# Patient Record
Sex: FEMALE | Race: OTHER | URBAN - METROPOLITAN AREA
[De-identification: names, ages, dates, MRNs, and addresses within clinical notes are randomized per-mention and may not be internally consistent; named-entity substitution may affect disease eponyms.]

---

## 2021-04-26 ENCOUNTER — OUTPATIENT (OUTPATIENT)
Dept: OUTPATIENT SERVICES | Facility: HOSPITAL | Age: 51
LOS: 1 days | Discharge: HOME | End: 2021-04-26
Payer: MEDICAID

## 2021-04-26 PROCEDURE — 88300 SURGICAL PATH GROSS: CPT | Mod: 26,59

## 2021-04-26 PROCEDURE — 88305 TISSUE EXAM BY PATHOLOGIST: CPT | Mod: 26

## 2021-05-01 LAB — SURGICAL PATHOLOGY STUDY: SIGNIFICANT CHANGE UP

## 2022-03-07 ENCOUNTER — INPATIENT (INPATIENT)
Facility: HOSPITAL | Age: 52
LOS: 1 days | Discharge: HOME | End: 2022-03-09
Attending: INTERNAL MEDICINE | Admitting: INTERNAL MEDICINE
Payer: MEDICAID

## 2022-03-07 VITALS
HEIGHT: 61 IN | TEMPERATURE: 98 F | SYSTOLIC BLOOD PRESSURE: 149 MMHG | OXYGEN SATURATION: 99 % | DIASTOLIC BLOOD PRESSURE: 98 MMHG | WEIGHT: 132.06 LBS | RESPIRATION RATE: 16 BRPM | HEART RATE: 80 BPM

## 2022-03-07 LAB
ALBUMIN SERPL ELPH-MCNC: 3.7 G/DL — SIGNIFICANT CHANGE UP (ref 3.3–5)
ALP SERPL-CCNC: 114 U/L — SIGNIFICANT CHANGE UP (ref 40–120)
ALT FLD-CCNC: 23 U/L — SIGNIFICANT CHANGE UP (ref 12–78)
ANION GAP SERPL CALC-SCNC: 5 MMOL/L — SIGNIFICANT CHANGE UP (ref 5–17)
APPEARANCE UR: CLEAR — SIGNIFICANT CHANGE UP
APTT BLD: 34.9 SEC — SIGNIFICANT CHANGE UP (ref 27.5–35.5)
AST SERPL-CCNC: 23 U/L — SIGNIFICANT CHANGE UP (ref 15–37)
BASOPHILS # BLD AUTO: 0.03 K/UL — SIGNIFICANT CHANGE UP (ref 0–0.2)
BASOPHILS NFR BLD AUTO: 0.4 % — SIGNIFICANT CHANGE UP (ref 0–2)
BILIRUB SERPL-MCNC: 0.2 MG/DL — SIGNIFICANT CHANGE UP (ref 0.2–1.2)
BILIRUB UR-MCNC: NEGATIVE — SIGNIFICANT CHANGE UP
BUN SERPL-MCNC: 15 MG/DL — SIGNIFICANT CHANGE UP (ref 7–23)
CALCIUM SERPL-MCNC: 9.1 MG/DL — SIGNIFICANT CHANGE UP (ref 8.5–10.1)
CHLORIDE SERPL-SCNC: 104 MMOL/L — SIGNIFICANT CHANGE UP (ref 96–108)
CO2 SERPL-SCNC: 30 MMOL/L — SIGNIFICANT CHANGE UP (ref 22–31)
COLOR SPEC: YELLOW — SIGNIFICANT CHANGE UP
CREAT SERPL-MCNC: 0.86 MG/DL — SIGNIFICANT CHANGE UP (ref 0.5–1.3)
DIFF PNL FLD: ABNORMAL
EGFR: 83 ML/MIN/1.73M2 — SIGNIFICANT CHANGE UP
EOSINOPHIL # BLD AUTO: 0.03 K/UL — SIGNIFICANT CHANGE UP (ref 0–0.5)
EOSINOPHIL NFR BLD AUTO: 0.4 % — SIGNIFICANT CHANGE UP (ref 0–6)
FLUAV AG NPH QL: SIGNIFICANT CHANGE UP
FLUBV AG NPH QL: SIGNIFICANT CHANGE UP
GLUCOSE BLDC GLUCOMTR-MCNC: 130 MG/DL — HIGH (ref 70–99)
GLUCOSE SERPL-MCNC: 90 MG/DL — SIGNIFICANT CHANGE UP (ref 70–99)
GLUCOSE UR QL: NEGATIVE MG/DL — SIGNIFICANT CHANGE UP
HCT VFR BLD CALC: 37.9 % — SIGNIFICANT CHANGE UP (ref 34.5–45)
HGB BLD-MCNC: 12.6 G/DL — SIGNIFICANT CHANGE UP (ref 11.5–15.5)
IMM GRANULOCYTES NFR BLD AUTO: 0.3 % — SIGNIFICANT CHANGE UP (ref 0–1.5)
INR BLD: 0.97 RATIO — SIGNIFICANT CHANGE UP (ref 0.88–1.16)
KETONES UR-MCNC: NEGATIVE — SIGNIFICANT CHANGE UP
LACTATE SERPL-SCNC: 1.5 MMOL/L — SIGNIFICANT CHANGE UP (ref 0.7–2)
LEUKOCYTE ESTERASE UR-ACNC: ABNORMAL
LYMPHOCYTES # BLD AUTO: 3.75 K/UL — HIGH (ref 1–3.3)
LYMPHOCYTES # BLD AUTO: 48.3 % — HIGH (ref 13–44)
MCHC RBC-ENTMCNC: 30.1 PG — SIGNIFICANT CHANGE UP (ref 27–34)
MCHC RBC-ENTMCNC: 33.2 G/DL — SIGNIFICANT CHANGE UP (ref 32–36)
MCV RBC AUTO: 90.5 FL — SIGNIFICANT CHANGE UP (ref 80–100)
MONOCYTES # BLD AUTO: 0.62 K/UL — SIGNIFICANT CHANGE UP (ref 0–0.9)
MONOCYTES NFR BLD AUTO: 8 % — SIGNIFICANT CHANGE UP (ref 2–14)
NEUTROPHILS # BLD AUTO: 3.31 K/UL — SIGNIFICANT CHANGE UP (ref 1.8–7.4)
NEUTROPHILS NFR BLD AUTO: 42.6 % — LOW (ref 43–77)
NITRITE UR-MCNC: NEGATIVE — SIGNIFICANT CHANGE UP
NRBC # BLD: 0 /100 WBCS — SIGNIFICANT CHANGE UP (ref 0–0)
PH UR: 8 — SIGNIFICANT CHANGE UP (ref 5–8)
PLATELET # BLD AUTO: 319 K/UL — SIGNIFICANT CHANGE UP (ref 150–400)
POTASSIUM SERPL-MCNC: 4.1 MMOL/L — SIGNIFICANT CHANGE UP (ref 3.5–5.3)
POTASSIUM SERPL-SCNC: 4.1 MMOL/L — SIGNIFICANT CHANGE UP (ref 3.5–5.3)
PROT SERPL-MCNC: 8.2 GM/DL — SIGNIFICANT CHANGE UP (ref 6–8.3)
PROT UR-MCNC: NEGATIVE MG/DL — SIGNIFICANT CHANGE UP
PROTHROM AB SERPL-ACNC: 11.6 SEC — SIGNIFICANT CHANGE UP (ref 10.5–13.4)
RBC # BLD: 4.19 M/UL — SIGNIFICANT CHANGE UP (ref 3.8–5.2)
RBC # FLD: 12.8 % — SIGNIFICANT CHANGE UP (ref 10.3–14.5)
SARS-COV-2 RNA SPEC QL NAA+PROBE: SIGNIFICANT CHANGE UP
SODIUM SERPL-SCNC: 139 MMOL/L — SIGNIFICANT CHANGE UP (ref 135–145)
SP GR SPEC: 1 — LOW (ref 1.01–1.02)
TROPONIN I, HIGH SENSITIVITY RESULT: 7.3 NG/L — SIGNIFICANT CHANGE UP
UROBILINOGEN FLD QL: NEGATIVE MG/DL — SIGNIFICANT CHANGE UP
WBC # BLD: 7.76 K/UL — SIGNIFICANT CHANGE UP (ref 3.8–10.5)
WBC # FLD AUTO: 7.76 K/UL — SIGNIFICANT CHANGE UP (ref 3.8–10.5)

## 2022-03-07 PROCEDURE — 70498 CT ANGIOGRAPHY NECK: CPT | Mod: 26,MA

## 2022-03-07 PROCEDURE — 70496 CT ANGIOGRAPHY HEAD: CPT | Mod: 26,MA

## 2022-03-07 PROCEDURE — 0042T: CPT

## 2022-03-07 PROCEDURE — 93010 ELECTROCARDIOGRAM REPORT: CPT

## 2022-03-07 PROCEDURE — 99291 CRITICAL CARE FIRST HOUR: CPT

## 2022-03-07 PROCEDURE — 99223 1ST HOSP IP/OBS HIGH 75: CPT

## 2022-03-07 RX ORDER — ACETAMINOPHEN 500 MG
650 TABLET ORAL EVERY 6 HOURS
Refills: 0 | Status: DISCONTINUED | OUTPATIENT
Start: 2022-03-07 | End: 2022-03-09

## 2022-03-07 RX ORDER — SODIUM CHLORIDE 9 MG/ML
1000 INJECTION INTRAMUSCULAR; INTRAVENOUS; SUBCUTANEOUS ONCE
Refills: 0 | Status: COMPLETED | OUTPATIENT
Start: 2022-03-07 | End: 2022-03-07

## 2022-03-07 RX ORDER — ASPIRIN/CALCIUM CARB/MAGNESIUM 324 MG
81 TABLET ORAL DAILY
Refills: 0 | Status: DISCONTINUED | OUTPATIENT
Start: 2022-03-07 | End: 2022-03-09

## 2022-03-07 RX ORDER — METOCLOPRAMIDE HCL 10 MG
10 TABLET ORAL ONCE
Refills: 0 | Status: COMPLETED | OUTPATIENT
Start: 2022-03-07 | End: 2022-03-07

## 2022-03-07 RX ORDER — ONDANSETRON 8 MG/1
4 TABLET, FILM COATED ORAL EVERY 8 HOURS
Refills: 0 | Status: DISCONTINUED | OUTPATIENT
Start: 2022-03-07 | End: 2022-03-09

## 2022-03-07 RX ORDER — LANOLIN ALCOHOL/MO/W.PET/CERES
3 CREAM (GRAM) TOPICAL AT BEDTIME
Refills: 0 | Status: DISCONTINUED | OUTPATIENT
Start: 2022-03-07 | End: 2022-03-09

## 2022-03-07 RX ORDER — ENOXAPARIN SODIUM 100 MG/ML
40 INJECTION SUBCUTANEOUS EVERY 24 HOURS
Refills: 0 | Status: DISCONTINUED | OUTPATIENT
Start: 2022-03-07 | End: 2022-03-09

## 2022-03-07 RX ORDER — SODIUM CHLORIDE 9 MG/ML
1000 INJECTION INTRAMUSCULAR; INTRAVENOUS; SUBCUTANEOUS
Refills: 0 | Status: DISCONTINUED | OUTPATIENT
Start: 2022-03-07 | End: 2022-03-09

## 2022-03-07 RX ORDER — ATORVASTATIN CALCIUM 80 MG/1
40 TABLET, FILM COATED ORAL AT BEDTIME
Refills: 0 | Status: DISCONTINUED | OUTPATIENT
Start: 2022-03-07 | End: 2022-03-09

## 2022-03-07 RX ADMIN — SODIUM CHLORIDE 1000 MILLILITER(S): 9 INJECTION INTRAMUSCULAR; INTRAVENOUS; SUBCUTANEOUS at 21:11

## 2022-03-07 NOTE — ED PROVIDER NOTE - CRITICAL CARE ATTENDING CONTRIBUTION TO CARE
Dr. Vito SANDERS: I performed a face to face bedside interview with patient regarding history of present illness, review of symptoms and past medical history. I completed an independent physical exam.  I have discussed patient's plan of care with PA.   I agree with note as stated above, having amended the EMR as needed to reflect my findings.   This includes HISTORY OF PRESENT ILLNESS, HIV, PAST MEDICAL/SURGICAL/FAMILY/SOCIAL HISTORY, ALLERGIES AND HOME MEDICATIONS, REVIEW OF SYSTEMS, PHYSICAL EXAM, and any PROGRESS NOTES during the time I functioned as the attending physician for this patient.     I have discussed the case with the resident/mid-level provider. I have personally performed a history, physical exam, and my own medical decision making. I have reviewed the note and agree with the findings and plan with the following exceptions: None.    Upon my evaluation, this patient had a high probability of imminent or life threatening deterioration due to CVA , which required my direct attention, intervention, and personal management.     I have personally provided 35 minutes of critical care time exclusive of time spent on separately billable procedures. Time includes review of laboratory data, radiology results, discussion with consultants, and monitoring for potential decompensation. Interventions were performed as documented above.    - Alex Padron MD, Emergency Medicine and Medical Toxicology Attending.

## 2022-03-07 NOTE — ED PROVIDER NOTE - PHYSICAL EXAMINATION
VITAL SIGNS: I have reviewed nursing notes and confirm.   GEN: Well-developed; well-nourished; in no acute distress. Speaking full sentences.  SKIN: Warm, pink, no rash, no diaphoresis, no cyanosis, well perfused.   HEAD: Normocephalic; atraumatic. No scalp lacerations, no abrasions.  NECK: Supple; non tender.   EYES: Pupils 3mm equal, round, reactive to light and accomodation, conjunctiva and sclera clear. Extra-ocular movements intact bilaterally.  ENT: No nasal discharge; airway clear. Trachea is midline. ORAL: No oropharyngeal exudates or erythema. Normal dentition.  CV: Regular rate and rhythm. S1, S2 normal; no murmurs, gallops, or rubs. No lower extremity pitting edema bilaterally. Capillary refill < 2 seconds throughout. Distal pulses intact 2+ throughout.  RESP: CTA bilaterally. No wheezes, rales, or rhonchi.   ABD: Normal bowel sounds, soft, non-distended, non-tender, no rebound, no guarding, no rigidity, no hepatosplenomegaly. No CVA tenderness bilaterally.  MSK: Normal range of motion and movement of all 4 extremities. No joint or muscular pain throughout. No clubbing.   BACK: No thoracolumbar midline or paravertebral tenderness. No step-offs or obvious deformities.  NEURO: Alert & oriented x 3, Grossly unremarkable. No facial droop. 4/5 motor strength in UE/LE. Decreased sensation in RUE to soft touch. otherwise sensory intact throughout. No facial numbness. Normal speech and coordination.   PSYCH: Cooperative, appropriate.

## 2022-03-07 NOTE — H&P ADULT - HISTORY OF PRESENT ILLNESS
This is a 51F PMHx of hypothyroidism presenting with RUE and RLE weakness as well as decreased sensation on R side of body and face since 2 PM today. Symptoms have not improved. denies associated symptoms such as pain, fever, chills syncope, h/a, confusion. CT and CTA of head negative for acute pathology. out of tPA window. /98. weakness in R upper and lower extremities on exam.

## 2022-03-07 NOTE — ED PROVIDER NOTE - PROGRESS NOTE DETAILS
- - -
CT head negative, CTA head/neck negative, CT perfusion negative. no neurosurgical intervention needed. Still w/ right sided weakness, without progression. will presume CVA, and will need MRI HEAD. neuro: unchanged with right sided weakness in UE/LE w/o facial droop, a/ox4, cooperative, coherent, no aphasia or dysarthria.

## 2022-03-07 NOTE — ED ADULT TRIAGE NOTE - CHIEF COMPLAINT QUOTE
right sided weakness with tongue heaviness since 1400 today. no bilateral extremity drift noted, no facial droop noted, no slurred speech noted. patient states she feels some pressure on her neck and vision changes, denies falls/trauma, denies n/v/dizziness, denies any pain. no neuro deficits noted  hx of hypothyroidism

## 2022-03-07 NOTE — H&P ADULT - NSHPPHYSICALEXAM_GEN_ALL_CORE
Constitutional: NAD aaox3  CV: RRR S1S2  Pulm: CTA b/l   GI: soft nontender nondistended + BS   Neuro CN II-XII grossly intact. 4/5 motor strength in UE/LE. Decreased sensation in RUE to soft touch. otherwise sensory intact throughout. No facial numbness. Normal speech and coordination.  Musculoskeletal: no pedal edema or calf tenderness.

## 2022-03-07 NOTE — ED PROVIDER NOTE - OBJECTIVE STATEMENT
51F PMHx of hypothyroidism presenting with weakness, last known well at 2pm today. Reporting right sided arm and leg weakness, difficulty walking, and right neck heaviness. Denies any chest pain, shortness of breath, fevers, chills, headaches, neck pain, neck stiffness, abdominal pain, nausea/vomiting, prior CVA. 51F PMHx of hypothyroidism presenting with weakness, last known well at 2pm today. Reporting right sided arm and leg weakness, difficulty walking, and right neck heaviness. Denies any chest pain, shortness of breath, fevers, chills, headaches, neck pain, neck stiffness, abdominal pain, nausea/vomiting, prior CVA.    this is a 51 y.o male with a PMhx of Hypothyroidism, with weakness. Patient has been having right arm weakness and leg weakness since 2 pm today. she states that the symptoms were sudden onset, weakness in the has been persistent. Patient also states that she has been having a numbness on the tongue as well, he denies having any fever, chills, bodyaches, headaches, dizziness, nausea, or vomiting, as per daughter she hasn't noticed changes in mental status,

## 2022-03-07 NOTE — H&P ADULT - ASSESSMENT
This is a 51F PMHx of hypothyroidism presenting with RUE and RLE weakness as well as decreased sensation on R side of body and face since 2 PM today. Symptoms have not improved. denies associated symptoms such as pain, fever, chills syncope, h/a, confusion. CT and CTA of head negative for acute pathology. out of tPA window. /98. weakness in R upper and lower extremities on exam.     Acute CVA  hypothyroidism  -asa and statin  -telemetry  -TTE with bubble study. carotid dopplers not needed since cta done.   -MRI brain  -f/u lipid panel, tfts, a1c  -neuro consult, PT consult   -npo except meds till swallow eval

## 2022-03-07 NOTE — PATIENT PROFILE ADULT - DOES PATIENT HAVE ADVANCE DIRECTIVE
Sheila ambulatory encounter    URGENT CARE INITIAL EVALUATION    CHIEF COMPLAINT:    Chief Complaint   Patient presents with   • Sore Throat     patient arrived with complaint of sore throat since Sunday.       SUBJECTIVE:  Veronica Sommers is a 40 year old female, who presents with a complaint of sore throat since Sunday. Has also had some slight congestion postnasal drip. Some itchy watery eyes and nose. States allergies been acting up. But since the throat and improved she wanted to make sure she doesn't have strep. No fevers no nausea no headache. Minimal if any cough. Denies any other issues or complaints no other contacts ill.    REVIEW OF SYSTEMS:  A review of systems was performed and findings relevant to this complaint are included in the HPI.    HISTORIES:  ALLERGIES:   Allergen Reactions   • Vicodin [Hydrocodone-Acetaminophen] NAUSEA   • Gluten Meal GI UPSET   • Seasonal Other (See Comments)       MEDICATIONS:  Current Outpatient Prescriptions   Medication Sig   • triamcinolone (ARISTOCORT) 0.1 % cream APPLY TO AFFECTED AREA TID AS NEEDED.  Dispense 60 grams   • ibuprofen (MOTRIN) 600 MG tablet Take 1 tablet by mouth every 6 hours as needed for Pain.   • Cholecalciferol (VITAMIN D3) 2000 units capsule Take 2,000 Units by mouth daily.   • citalopram (CELEXA) 40 MG tablet TAKE 1 TABLET BY MOUTH DAILY.   • APRI 0.15-30 MG-MCG per tablet TAKE 1 TABLET BY MOUTH DAILY.   • triamcinolone (ARISTOCORT) 0.1 % cream APPLY TO AFFECTED AREA TID AS NEEDED.  Dispense 30 grams   • pantoprazole (PROTONIX) 40 MG tablet Take 1 tablet by mouth daily. (Patient taking differently: Take 40 mg by mouth nightly. )   • montelukast (SINGULAIR) 10 MG tablet Take 1 tablet by mouth nightly.   • loratadine (CLARITIN) 10 MG tablet Take 10 mg by mouth daily.   • fluticasone (FLONASE) 50 MCG/ACT nasal spray Spray 1 spray in each nostril daily.   • terconazole 0.4 % vaginal cream Insert one applicatorful intravaginally for 7  consecutive days   • ferrous sulfate 325 (65 FE) MG tablet Take 1 tablet by mouth daily (with breakfast).   • DRYSOL 20 % external solution APPLY UNDER BOTH ARMS AT BEDTIME, REMOVE NEXT MORNING FOR 3 DAYS, THEN APPLY TWICE A WEEK FOR MAINTENANCE.     No current facility-administered medications for this visit.            I have reviewed the past medical history, family history, social history, medications and allergies listed in the medical record as obtained by my nursing staff and support staff and agree with their documentation    OBJECTIVE:  PHYSICAL EXAM:  Visit Vitals  /76   Pulse 79   Temp 96.6 °F (35.9 °C)   Resp 18   Ht 5' 5\" (1.651 m)   Wt 82.9 kg   LMP 04/20/2018 (Exact Date)   SpO2 97%   BMI 30.41 kg/m²     General:  Well hydrated, well nourished female who appears in no acute distress.  Eyes:  No jaundice, injection or drainage.  Ears:  Normal canals.  Normal tympanic membranes.   No external tenderness.  Oral Cavity:  No trismus.  Good dentition.  No lesions of gums or hard palate.  Posterior Pharynx:  Noninjected positive clear posterior drainage  Neck:  Anterior and posterior lymphadenopathy.  Supple.  Lungs:  Clear to auscultation.  No wheezes, rales or rhonchi.  Abdomen:  Soft.  Nontender.  Nondistended.  No hepatomegaly, no splenomegaly.  No rash.  Skin:  No scarletaform rash appreciated.    URGENT CARE COURSE:  Rapid strep:  Negative  Strep culture:  Pending      ASSESSMENT:  1. Pharyngitis, unspecified etiology    2. Allergic rhinitis, unspecified seasonality, unspecified trigger            PLAN:  Orders Placed This Encounter   • STREP GROUP A SCREEN RAPID WITH REFLEX TO CULTURE   • Strep Grp A Culture - collected in office, sent to lab       FOLLOW-UP:  Primary care as needed    PATIENT INSTRUCTIONS:  Discussed with patient this is likely secondary to allergic rhinitis producing some increased drainage and irritating the throat and eustachian tubes. We'll treat symptomatically at the  current time. Recommend ibuprofen 4-600 mg 2-3 times daily over the next 3-4 days. Fluids rest Tylenol as needed. Symptomatic home care was discussed instructions were given. If he have any new or worsening symptoms should return for reevaluation at that time.    The patient was advised to follow up with primary physician or to recheck with the urgent care clinic sooner if symptoms get worse or if new symptoms appear.    The patient indicated understanding of the diagnosis and agreed with the plan of care.     No

## 2022-03-07 NOTE — PATIENT PROFILE ADULT - FALL HARM RISK - UNIVERSAL INTERVENTIONS
Bed in lowest position, wheels locked, appropriate side rails in place/Call bell, personal items and telephone in reach/Instruct patient to call for assistance before getting out of bed or chair/Non-slip footwear when patient is out of bed/[place] to call system/Physically safe environment - no spills, clutter or unnecessary equipment/Purposeful Proactive Rounding/Room/bathroom lighting operational, light cord in reach

## 2022-03-07 NOTE — ED PROVIDER NOTE - CARE PLAN
1 Principal Discharge DX:	CVA (cerebrovascular accident)   Principal Discharge DX:	Arm weakness   Principal Discharge DX:	Acute CVA (cerebrovascular accident)

## 2022-03-08 DIAGNOSIS — E03.9 HYPOTHYROIDISM, UNSPECIFIED: ICD-10-CM

## 2022-03-08 DIAGNOSIS — I63.9 CEREBRAL INFARCTION, UNSPECIFIED: ICD-10-CM

## 2022-03-08 LAB
A1C WITH ESTIMATED AVERAGE GLUCOSE RESULT: 5.7 % — HIGH (ref 4–5.6)
ALBUMIN SERPL ELPH-MCNC: 3.4 G/DL — SIGNIFICANT CHANGE UP (ref 3.3–5)
ALP SERPL-CCNC: 97 U/L — SIGNIFICANT CHANGE UP (ref 40–120)
ALT FLD-CCNC: 21 U/L — SIGNIFICANT CHANGE UP (ref 12–78)
ANION GAP SERPL CALC-SCNC: 4 MMOL/L — LOW (ref 5–17)
AST SERPL-CCNC: 21 U/L — SIGNIFICANT CHANGE UP (ref 15–37)
BACTERIA # UR AUTO: ABNORMAL
BILIRUB SERPL-MCNC: 0.4 MG/DL — SIGNIFICANT CHANGE UP (ref 0.2–1.2)
BUN SERPL-MCNC: 12 MG/DL — SIGNIFICANT CHANGE UP (ref 7–23)
CALCIUM SERPL-MCNC: 9.3 MG/DL — SIGNIFICANT CHANGE UP (ref 8.5–10.1)
CHLORIDE SERPL-SCNC: 106 MMOL/L — SIGNIFICANT CHANGE UP (ref 96–108)
CHOLEST SERPL-MCNC: 251 MG/DL — HIGH
CO2 SERPL-SCNC: 28 MMOL/L — SIGNIFICANT CHANGE UP (ref 22–31)
CREAT SERPL-MCNC: 0.7 MG/DL — SIGNIFICANT CHANGE UP (ref 0.5–1.3)
EGFR: 106 ML/MIN/1.73M2 — SIGNIFICANT CHANGE UP
EPI CELLS # UR: SIGNIFICANT CHANGE UP
ESTIMATED AVERAGE GLUCOSE: 117 MG/DL — HIGH (ref 68–114)
GLUCOSE SERPL-MCNC: 86 MG/DL — SIGNIFICANT CHANGE UP (ref 70–99)
HCT VFR BLD CALC: 36 % — SIGNIFICANT CHANGE UP (ref 34.5–45)
HDLC SERPL-MCNC: 75 MG/DL — SIGNIFICANT CHANGE UP
HGB BLD-MCNC: 12 G/DL — SIGNIFICANT CHANGE UP (ref 11.5–15.5)
LIPID PNL WITH DIRECT LDL SERPL: 160 MG/DL — HIGH
MCHC RBC-ENTMCNC: 30.2 PG — SIGNIFICANT CHANGE UP (ref 27–34)
MCHC RBC-ENTMCNC: 33.3 G/DL — SIGNIFICANT CHANGE UP (ref 32–36)
MCV RBC AUTO: 90.5 FL — SIGNIFICANT CHANGE UP (ref 80–100)
NON HDL CHOLESTEROL: 176 MG/DL — HIGH
NRBC # BLD: 0 /100 WBCS — SIGNIFICANT CHANGE UP (ref 0–0)
PLATELET # BLD AUTO: 288 K/UL — SIGNIFICANT CHANGE UP (ref 150–400)
POTASSIUM SERPL-MCNC: 3.9 MMOL/L — SIGNIFICANT CHANGE UP (ref 3.5–5.3)
POTASSIUM SERPL-SCNC: 3.9 MMOL/L — SIGNIFICANT CHANGE UP (ref 3.5–5.3)
PROT SERPL-MCNC: 7.5 GM/DL — SIGNIFICANT CHANGE UP (ref 6–8.3)
RBC # BLD: 3.98 M/UL — SIGNIFICANT CHANGE UP (ref 3.8–5.2)
RBC # FLD: 12.9 % — SIGNIFICANT CHANGE UP (ref 10.3–14.5)
RBC CASTS # UR COMP ASSIST: SIGNIFICANT CHANGE UP /HPF (ref 0–4)
SODIUM SERPL-SCNC: 138 MMOL/L — SIGNIFICANT CHANGE UP (ref 135–145)
T4 FREE SERPL-MCNC: 1.2 NG/DL — SIGNIFICANT CHANGE UP (ref 0.9–1.8)
TRIGL SERPL-MCNC: 76 MG/DL — SIGNIFICANT CHANGE UP
TSH SERPL-MCNC: 5.29 UIU/ML — HIGH (ref 0.36–3.74)
WBC # BLD: 5.96 K/UL — SIGNIFICANT CHANGE UP (ref 3.8–10.5)
WBC # FLD AUTO: 5.96 K/UL — SIGNIFICANT CHANGE UP (ref 3.8–10.5)
WBC UR QL: SIGNIFICANT CHANGE UP

## 2022-03-08 PROCEDURE — 99232 SBSQ HOSP IP/OBS MODERATE 35: CPT

## 2022-03-08 PROCEDURE — 72141 MRI NECK SPINE W/O DYE: CPT | Mod: 26

## 2022-03-08 PROCEDURE — 70551 MRI BRAIN STEM W/O DYE: CPT | Mod: 26

## 2022-03-08 PROCEDURE — 99222 1ST HOSP IP/OBS MODERATE 55: CPT

## 2022-03-08 PROCEDURE — 93306 TTE W/DOPPLER COMPLETE: CPT | Mod: 26

## 2022-03-08 RX ADMIN — SODIUM CHLORIDE 75 MILLILITER(S): 9 INJECTION INTRAMUSCULAR; INTRAVENOUS; SUBCUTANEOUS at 05:53

## 2022-03-08 RX ADMIN — Medication 81 MILLIGRAM(S): at 11:58

## 2022-03-08 RX ADMIN — ATORVASTATIN CALCIUM 40 MILLIGRAM(S): 80 TABLET, FILM COATED ORAL at 21:48

## 2022-03-08 RX ADMIN — Medication 650 MILLIGRAM(S): at 20:06

## 2022-03-08 RX ADMIN — SODIUM CHLORIDE 75 MILLILITER(S): 9 INJECTION INTRAMUSCULAR; INTRAVENOUS; SUBCUTANEOUS at 21:54

## 2022-03-08 RX ADMIN — ENOXAPARIN SODIUM 40 MILLIGRAM(S): 100 INJECTION SUBCUTANEOUS at 05:51

## 2022-03-08 RX ADMIN — Medication 3 MILLIGRAM(S): at 21:48

## 2022-03-08 RX ADMIN — Medication 650 MILLIGRAM(S): at 21:50

## 2022-03-08 NOTE — PHYSICAL THERAPY INITIAL EVALUATION ADULT - PERTINENT HX OF CURRENT PROBLEM, REHAB EVAL
This is a 51F PMHx of hypothyroidism presenting with RUE and RLE weakness as well as decreased sensation on R side of body and face . Pt was admitted c Dx of acute cva. CT,  CTA, and MRI of head negative for acute pathology.

## 2022-03-08 NOTE — SWALLOW BEDSIDE ASSESSMENT ADULT - SLP GENERAL OBSERVATIONS
pt seen sitting at the edge of the bed, alert and oriented x4. pt responded to questions with good accuracy, she verbalized wants/needs and she was able to [facility]. noted good speech intelligibility and voice WNL. dtr present at the bedside.

## 2022-03-08 NOTE — PROGRESS NOTE ADULT - ASSESSMENT
51F PMHx of hypothyroidism presenting with RUE and RLE weakness as well as decreased sensation on R side of body and face.  Symptoms slightly improved.  CT Head, CTA Head and neck without acute pathology.  MRI not suggestive of CVA.      # R sided weakness - CT, MRI not suggestive of CVA.  Neuro input appreciated.  TTE.  MR C-spine d/w Dr. Del Valle.  ASA, Statin.  # Hypothyroidism - Synthroid 50 mcg.  # Inpatient DVT Prophylaxis - Lovenox subcut

## 2022-03-08 NOTE — PHYSICAL THERAPY INITIAL EVALUATION ADULT - ADDITIONAL COMMENTS
There are 3 steps, c close thelma rails, at the entry of the house and one flight of steps, c close thelma rails, to negotiate at home.

## 2022-03-08 NOTE — SWALLOW BEDSIDE ASSESSMENT ADULT - H & P REVIEW
This is a 51F PMHx of hypothyroidism presenting with RUE and RLE weakness as well as decreased sensation on R side of body and face since 2 PM today. Symptoms have not improved. denies associated symptoms such as pain, fever, chills syncope, h/a, confusion. CT and CTA of head negative for acute pathology. out of tPA window. /98. weakness in R upper and lower extremities on exam./yes

## 2022-03-08 NOTE — SWALLOW BEDSIDE ASSESSMENT ADULT - SPECIFY REASON(S)
MD order stated NPO until further recommendations made. DIANA Andujar reported pt tolerated thin liquid without difficulty

## 2022-03-08 NOTE — CONSULT NOTE ADULT - SUBJECTIVE AND OBJECTIVE BOX
NEUROLOGY CONSULT    HPI:  50 yo woman admitted with new onset of right sided weakness and numbness x 1 day.  Patient reports numbness in right face/arm/leg, and weakness in right arm and leg.  Has been having headaches over the past few days, and today reporting vertigo.  Also reporting neck stiffness/discomfort.  No prior history of neurological events.  +family history of migraine headaches.    PMHx: hypothyroidism    EKG: NSR    MRI brain: no acute pathology  CTA head/neck: no LVO    Chol = 256  LDL = 160  A1C = 5.7    NEUROLOGICAL EXAM:  T 98 F  /88 HR 80  MS: Awake, alert, oriented x 4, language fluent, comprehension intact, naming intact, repetition intact, normal affect  CN: PERRLA, EOMI, visual fields intact, no papilledema, +impaired to LT in right V1/V2/V3, face symmetric, hearing intact, no dysarthria, symmetric palatal elevation, tongue midline, symmetric shoulder shrug and SCM strength  Motor: normal bulk, normal tone, 4+/5 in RUE/RLE (proximally and distally)  Sensation: impaired LT/vibration in RUE/RLE, intact proprioception  Reflexes: 2+ at biceps, triceps, brachioradialis, 3+ at patella, ankle bilaterally.  Flexor plantar response bilaterally.  No clonus  Coordination: no dysmetria    Assessment:  50 yo woman with right sided weakness and numbness predominantly affecting the arm and leg, with neck stiffness.    Recommendations:  1. MRI cervical spine to rule out demyelinating lesion  2. PT/OT marshall Del Valle MD  [facility] Department of Neurology  Epilepsy [facility]

## 2022-03-08 NOTE — PROGRESS NOTE ADULT - SUBJECTIVE AND OBJECTIVE BOX
Patient: LIANA JHA [id number] 51y Female                            Hospitalist Attending Note    Seen with daughter at bedside.  C/o persistent R sided weakness, RUE > RLE.  Mild R facial droop.  Symptoms improving since admission.     ____________________PHYSICAL EXAM:  GENERAL:  NAD Alert and Oriented x 3   HEENT: NCAT  CARDIOVASCULAR:  S1, S2  LUNGS: CTAB  ABDOMEN:  soft, (-) tenderness, (-) distension, (+) bowel sounds, (-) guarding, (-) rebound (-) rigidity  EXTREMITIES:  no cyanosis / clubbing / edema.   NEURO: subtle R facial droop.  RUE strength 4/5, RLE strength 5-/5.  LUE, LLE strength 5/5.   ____________________     VITALS:  Vital Signs Last 24 Hrs  T(C): 36.7 (08 Mar 2022 12:16), Max: 36.9 (08 Mar 2022 04:49)  T(F): 98 (08 Mar 2022 12:16), Max: 98.5 (08 Mar 2022 04:49)  HR: 75 (08 Mar 2022 14:58) (68 - 82)  BP: 131/85 (08 Mar 2022 14:58) (118/78 - 149/98)  BP(mean): --  RR: 18 (08 Mar 2022 14:07) (16 - 18)  SpO2: 100% (08 Mar 2022 14:07) (97% - 100%) Daily Height in cm: 154.94 (07 Mar 2022 19:15)    Daily Weight in k.3 (07 Mar 2022 22:56)  CAPILLARY BLOOD GLUCOSE      POCT Blood Glucose.: 130 mg/dL (07 Mar 2022 19:32)    I&O's Summary      HISTORY:  PAST MEDICAL & SURGICAL HISTORY:  Allergies    No Known Allergies    Intolerances       LABS:                        12.0   5.96  )-----------( 288      ( 08 Mar 2022 06:56 )             36.0     03-08    138  |  106  |  12  ----------------------------<  86  3.9   |  28  |  0.70    Ca    9.3      08 Mar 2022 06:56    TPro  7.5  /  Alb  3.4  /  TBili  0.4  /  DBili  x   /  AST  21  /  ALT  21  /  AlkPhos  97  03-08    PT/INR - ( 07 Mar 2022 20:25 )   PT: 11.6 sec;   INR: 0.97 ratio         PTT - ( 07 Mar 2022 20:25 )  PTT:34.9 sec  LIVER FUNCTIONS - ( 08 Mar 2022 06:56 )  Alb: 3.4 g/dL / Pro: 7.5 gm/dL / ALK PHOS: 97 U/L / ALT: 21 U/L / AST: 21 U/L / GGT: x           Urinalysis Basic - ( 07 Mar 2022 22:26 )    Color: Yellow / Appearance: Clear / S.005 / pH: x  Gluc: x / Ketone: Negative  / Bili: Negative / Urobili: Negative mg/dL   Blood: x / Protein: Negative mg/dL / Nitrite: Negative   Leuk Esterase: Trace / RBC: 0-2 /HPF / WBC 0-2   Sq Epi: x / Non Sq Epi: Occasional / Bacteria: Occasional              MEDICATIONS:  MEDICATIONS  (STANDING):  aspirin  chewable 81 milliGRAM(s) Oral daily  atorvastatin 40 milliGRAM(s) Oral at bedtime  enoxaparin Injectable 40 milliGRAM(s) SubCutaneous every 24 hours  sodium chloride 0.9%. 1000 milliLiter(s) (75 mL/Hr) IV Continuous <Continuous>    MEDICATIONS  (PRN):  acetaminophen     Tablet .. 650 milliGRAM(s) Oral every 6 hours PRN Temp greater or equal to 38C (100.4F), Mild Pain (1 - 3)  aluminum hydroxide/magnesium hydroxide/simethicone Suspension 30 milliLiter(s) Oral every 4 hours PRN Dyspepsia  melatonin 3 milliGRAM(s) Oral at bedtime PRN Insomnia  ondansetron Injectable 4 milliGRAM(s) IV Push every 8 hours PRN Nausea and/or Vomiting

## 2022-03-08 NOTE — PHYSICAL THERAPY INITIAL EVALUATION ADULT - GENERAL OBSERVATIONS, REHAB EVAL
Pt was seen sitting in bed c cardiac monitor, alert and Ox4. Pt deferred  Video  and preferred her daughter, Brianna to translate throughout P.T. intervention. Mild facial weakness and swelling of R hand was noted. Sensation of R forearm, wrist and hand and R foot is impaired. Peripheral vision is decreased in thelma lower quarter. Coordination is intact. Weakness MS of R LE is noted c MS 3+/5.

## 2022-03-08 NOTE — SWALLOW BEDSIDE ASSESSMENT ADULT - SLP PERTINENT HISTORY OF CURRENT PROBLEM
Patient Education        hydroxyzine  Pronunciation:  ty DROX ee zeen  Brand:  Vistaril  What is the most important information I should know about hydroxyzine? You should not use hydroxyzine if you are pregnant, especially during the first or second trimester. Hydroxyzine can cause a serious heart problem, especially if you use certain medicines at the same time. Tell your doctor about all your current medicines and any you start or stop using. What is hydroxyzine? Hydroxyzine reduces activity in the central nervous system. It also acts as an antihistamine that reduces the effects of natural chemical histamine in the body. Histamine can produce symptoms of itching, or hives on the skin. Hydroxyzine is used as a sedative to treat anxiety and tension. It is also used together with other medications given during and after general anesthesia. Hydroxyzine is also used to treat allergic skin reactions such as hives or contact dermatitis. Hydroxyzine may also be used for purposes not listed in this medication guide. What should I discuss with my healthcare provider before taking hydroxyzine? You should not use hydroxyzine if you are allergic to it, or if:  · you have long QT syndrome;  · you are allergic to cetirizine (Zyrtec) or levocetirizine (Xyzal); or  · you are in the first trimester of pregnancy. You should not use hydroxyzine if you are pregnant, especially during the first or second trimester. Hydroxyzine could harm the unborn baby or cause birth defects. Use effective birth control to prevent pregnancy while you are using this medicine.   To make sure hydroxyzine is safe for you, tell your doctor if you have:  · blockage in your digestive tract (stomach or intestines);  · bladder obstruction or other urination problems;  · glaucoma;  · heart disease, slow heartbeats;  · personal or family history of long QT syndrome;  · an electrolyte imbalance (such as high or low levels of potassium in your blood);  · if you have recently had a heart attack. It is not known whether hydroxyzine passes into breast milk or if it could harm a nursing baby. You should not breast-feed while using this medicine. Do not give this medicine to a child without medical advice. How should I take hydroxyzine? Follow all directions on your prescription label. Your doctor may occasionally change your dose. Do not use this medicine in larger or smaller amounts or for longer than recommended. Shake the oral suspension (liquid) well just before you measure a dose. Measure liquid medicine with the dosing syringe provided, or with a special dose-measuring spoon or medicine cup. If you do not have a dose-measuring device, ask your pharmacist for one. Hydroxyzine is for short-term use only. You should not take this medicine for longer than 4 months. Call your doctor if your anxiety symptoms do not improve, or if they get worse. Store at room temperature away from moisture and heat. What happens if I miss a dose? Take the missed dose as soon as you remember. Skip the missed dose if it is almost time for your next scheduled dose. Do not take extra medicine to make up the missed dose. What happens if I overdose? Seek emergency medical attention or call the Poison Help line at 1-857.887.7753. Overdose symptoms may include severe drowsiness, nausea, vomiting, uncontrolled muscle movements, or seizure (convulsions). What should I avoid while taking hydroxyzine? This medicine may impair your thinking or reactions. Be careful if you drive or do anything that requires you to be alert. Drinking alcohol with this medicine can cause side effects. What are the possible side effects of hydroxyzine? Get emergency medical help if you have signs of an allergic reaction:  hives; difficult breathing; swelling of your face, lips, tongue, or throat. In rare cases, hydroxyzine may cause a severe skin reaction.  Stop taking this medicine and call information provided by [address]ita [address] is accurate, up-to-date, and complete, but no guarantee is made to that effect. Drug information contained herein may be time sensitive. [facility] information has been compiled for use by healthcare practitioners and consumers in the [place] and therefore [facility] does not warrant that uses outside of the [place] are appropriate, unless specifically indicated otherwise. [facility]'s drug information does not endorse drugs, diagnose patients or recommend therapy. [facility]'s drug information is an informational resource designed to assist licensed healthcare practitioners in caring for their patients and/or to serve consumers viewing this service as a supplement to, and not a substitute for, the expertise, skill, knowledge and judgment of healthcare practitioners. The absence of a warning for a given drug or drug combination in no way should be construed to indicate that the drug or drug combination is safe, effective or appropriate for any given patient. [facility] does not assume any responsibility for any aspect of healthcare administered with the aid of information [facility] provides. The information contained herein is not intended to cover all possible uses, directions, precautions, warnings, drug interactions, allergic reactions, or adverse effects. If you have questions about the drugs you are taking, check with your doctor, nurse or pharmacist.  Copyright 0401-6959 [address]. Version: 8.01. Revision date: 3/15/2017. Care instructions adapted under license by [facility] [place] ([facility]). If you have questions about a medical condition or this instruction, always ask your healthcare professional. [place] [address] any warranty or liability for your use of this information. Patient Education        buspirone  Pronunciation:  daisy nunez  Brand: BuSpar  What is the most important information I should know about buspirone?   Do not use this from another anxiety medication, you may need to slowly decrease your dose of the other medication rather than stopping suddenly. Some anxiety medications can cause withdrawal symptoms when you stop taking them suddenly after long-term use. Read and carefully follow any Instructions for Use provided with your medicine. Ask your doctor or pharmacist if you do not understand these instructions. Some buspirone tablets are scored so you can break the tablet into 2 or 3 pieces in order to take a smaller dose. Do not use a buspirone tablet if it has not been broken correctly and the piece is too big or too small. Follow your doctor's instructions about how much of the tablet to take. Buspirone can cause false positive results with certain medical tests. You may need to stop using the medicine for at least 48 hours before your test. Tell any doctor who treats you that you are using buspirone. Store at room temperature away from moisture, heat, and light. What happens if I miss a dose? Take the medicine as soon as you can, but skip the missed dose if it is almost time for your next dose. Do not take two doses at one time. What happens if I overdose? Seek emergency medical attention or call the Poison Help line at 1-600.896.3983. What should I avoid while taking buspirone? Avoid driving or hazardous activity until you know how this medicine will affect you. Your reactions could be impaired. Drinking alcohol may increase certain side effects of buspirone. Grapefruit may interact with buspirone and lead to unwanted side effects. Avoid the use of grapefruit products. What are the possible side effects of buspirone? Get emergency medical help if you have signs of an allergic reaction: hives; difficult breathing; swelling of your face, lips, tongue, or throat. Call your doctor at once if you have:  · chest pain;  · shortness of breath; or  · a light-headed feeling, like you might pass out.   Seek medical attention right away if you have symptoms of serotonin syndrome, such as: agitation, hallucinations, fever, sweating, shivering, fast heart rate, muscle stiffness, twitching, loss of coordination, nausea, vomiting, or diarrhea. Common side effects may include:  · headache;  · dizziness, feeling light-headed;  · nausea; or  · feeling nervous or excited. This is not a complete list of side effects and others may occur. Call your doctor for medical advice about side effects. You may report side effects to FDA at 2-989-FDA-7018. What other drugs will affect buspirone? Using buspirone with other drugs that make you drowsy or slow your breathing can worsen these effects. Ask your doctor before using opioid medication, a sleeping pill, a muscle relaxer, or medicine for anxiety or seizures. Tell your doctor about all your current medicines. Many drugs can affect buspirone, especially:  · nefazodone;  · Elena's wort;  · an antibiotic --clarithromycin, erythromycin, rifabutin, rifampin, rifapentine, telithromycin;  · antifungal medicine --itraconazole, ketoconazole;  · antiviral medicine to treat HIV/AIDS --efavirenz, indinavir, nelfinavir, nevirapine, ritonavir, saquinavir;  · cancer medicine --apalutamide, enzalutamide, mitotane;  · heart or blood pressure medicines --diltiazem, verapamil;  · seizure medicine --carbamazepine, oxcarbazepine, phenytoin, primidone; or  · steroid medicine --dexamethasone, prednisone. This list is not complete and many other drugs may affect buspirone. This includes prescription and over-the-counter medicines, vitamins, and herbal products. Not all possible drug interactions are listed here. Where can I get more information? Your pharmacist can provide more information about buspirone. Remember, keep this and all other medicines out of the reach of children, never share your medicines with others, and use this medication only for the indication prescribed.    Every effort has been made to ensure that the information provided by [address] is accurate, up-to-date, and complete, but no guarantee is made to that effect. Drug information contained herein may be time sensitive. [facility] information has been compiled for use by healthcare practitioners and consumers in the [place] and therefore [facility] does not warrant that uses outside of the [place] are appropriate, unless specifically indicated otherwise. [facility]'s drug information does not endorse drugs, diagnose patients or recommend therapy. [facility]'s drug information is an informational resource designed to assist licensed healthcare practitioners in caring for their patients and/or to serve consumers viewing this service as a supplement to, and not a substitute for, the expertise, skill, knowledge and judgment of healthcare practitioners. The absence of a warning for a given drug or drug combination in no way should be construed to indicate that the drug or drug combination is safe, effective or appropriate for any given patient. [facility] does not assume any responsibility for any aspect of healthcare administered with the aid of information [facility] provides. The information contained herein is not intended to cover all possible uses, directions, precautions, warnings, drug interactions, allergic reactions, or adverse effects. If you have questions about the drugs you are taking, check with your doctor, nurse or pharmacist.  Copyright 5102-1521 [address]. Version: 6.01. Revision date: 2/24/2020. Care instructions adapted under license by [facility] [place] ([facility]). If you have questions about a medical condition or this instruction, always ask your healthcare professional. [place] [address] any warranty or liability for your use of this information. Patient Education        sertraline  Pronunciation:  SER tra helene  Brand:  Zoloft  What is the most important information I should know about sertraline?   You should not use sertraline if you also take pimozide, or if you are being treated with methylene blue injection. Do not use this medicine if you have used an MAO inhibitor in the past 14 days, such as isocarboxazid, linezolid, methylene blue injection, phenelzine, rasagiline, selegiline, or tranylcypromine. Some young people have thoughts about suicide when first taking an antidepressant. Stay alert to changes in your mood or symptoms. Report any new or worsening symptoms to your doctor. Seek medical attention right away if you have symptoms of serotonin syndrome, such as: agitation, hallucinations, fever, sweating, shivering, fast heart rate, muscle stiffness, twitching, loss of coordination, nausea, vomiting, or diarrhea. What is sertraline? Sertraline is an antidepressant in a group of drugs called selective serotonin reuptake inhibitors (SSRIs). Sertraline affects chemicals in the brain that may be unbalanced in people with depression, panic, anxiety, or obsessive-compulsive symptoms. Sertraline is used to treat depression, obsessive-compulsive disorder, panic disorder, anxiety disorders, post-traumatic stress disorder (PTSD), and premenstrual dysphoric disorder (PMDD). Sertraline may also be used for purposes not listed in this medication guide. What should I discuss with my healthcare provider before taking sertraline? You should not use sertraline if you are allergic to it, or if you also take pimozide. Do not use the liquid form of sertraline  if you are taking disulfiram (Antabuse) or you could have a severe reaction to the disulfiram.  Do not take sertraline within 14 days before or 14 days after you take an MAO inhibitor. A dangerous drug interaction could occur. MAO inhibitors include isocarboxazid, linezolid, phenelzine, rasagiline, selegiline, and tranylcypromine.   To make sure sertraline is safe for you, tell your doctor if you have ever had:  · heart disease, high blood pressure, or a stroke;  · liver or kidney disease;  · a seizure;  · bleeding problems, or if you take warfarin (Coumadin, Jantoven);  · bipolar disorder (manic depression); or  · low levels of sodium in your blood. Some medicines can interact with sertraline and cause a serious condition called serotonin syndrome. Be sure your doctor knows if you also take stimulant medicine, opioid medicine, herbal products, other antidepressants, or medicine for mental illness, Parkinson's disease, migraine headaches, serious infections, or prevention of nausea and vomiting. Ask your doctor before making any changes in how or when you take your medications. Some young people have thoughts about suicide when first taking an antidepressant. Your doctor should check your progress at regular visits. Your family or other caregivers should also be alert to changes in your mood or symptoms. Taking an SSRI antidepressant during pregnancy may cause serious lung problems or other complications in the baby. However, you may have a relapse of depression if you stop taking your antidepressant. Tell your doctor right away if you become pregnant. Do not start or stop taking this medicine during pregnancy without your doctor's advice. It is not known whether sertraline passes into breast milk or if it could harm a nursing baby. Tell your doctor if you are breast-feeding a baby. Do not give sertraline to anyone younger than 25years old without the advice of a doctor. Sertraline is FDA-approved for children with obsessive-compulsive disorder (OCD). It is not approved for treating depression in children. How should I take sertraline? Follow all directions on your prescription label. Your doctor may occasionally change your dose. Do not take this medicine in larger or smaller amounts or for longer than recommended. Sertraline may be taken with or without food. Try to take the medicine at the same time each day.   The liquid (oral concentrate) form of sertraline must be diluted before you take it. To be sure you get the correct dose, measure the liquid with the medicine dropper provided. Mix the dose with 4 ounces (one-half cup) of water, ginger ale, lemon/lime soda, lemonade, or orange juice. Do not use any other liquids to dilute the medicine. Stir this mixture and drink all of it right away. To make sure you get the entire dose, add a little more water to the same glass, swirl gently and drink right away. This medicine can cause you to have a false positive drug screening test. If you provide a urine sample for drug screening, tell the laboratory staff that you are taking sertraline. It may take up to 4 weeks before your symptoms improve. Keep using the medication as directed and tell your doctor if your symptoms do not improve. Do not stop using sertraline suddenly, or you could have unpleasant withdrawal symptoms. Ask your doctor how to safely stop using sertraline. Store at room temperature away from moisture and heat. What happens if I miss a dose? Take the missed dose as soon as you remember. Skip the missed dose if it is almost time for your next scheduled dose. Do not take extra medicine to make up the missed dose. What happens if I overdose? Seek emergency medical attention or call the Poison Help line at 1-649.843.4361. What should I avoid while taking sertraline? Do not drink alcohol. Ask your doctor before taking a nonsteroidal anti-inflammatory drug (NSAID) for pain, arthritis, fever, or swelling. This includes aspirin, ibuprofen (Advil, Motrin), naproxen (Aleve), celecoxib (Celebrex), diclofenac, indomethacin, meloxicam, and others. Using an NSAID with sertraline may cause you to bruise or bleed easily. This medication may impair your thinking or reactions. Be careful if you drive or do anything that requires you to be alert. What are the possible side effects of sertraline?   Get emergency medical help if you have signs of an allergic reaction: skin rash or hives (with or without fever or joint pain); difficulty breathing; swelling of your face, lips, tongue, or throat. Report any new or worsening symptoms to your doctor, such as: mood or behavior changes, anxiety, panic attacks, trouble sleeping, or if you feel impulsive, irritable, agitated, hostile, aggressive, restless, hyperactive (mentally or physically), more depressed, or have thoughts about suicide or hurting yourself. Call your doctor at once if you have:  · a seizure (convulsions);  · blurred vision, tunnel vision, eye pain or swelling;  · low levels of sodium in the body --headache, confusion, memory problems, severe weakness, feeling unsteady; or  · manic episodes --racing thoughts, increased energy, unusual risk-taking behavior, extreme happiness, being irritable or talkative. Seek medical attention right away if you have symptoms of serotonin syndrome, such as: agitation, hallucinations, fever, sweating, shivering, fast heart rate, muscle stiffness, twitching, loss of coordination, nausea, vomiting, or diarrhea. Common side effects may include:  · drowsiness, tiredness, feeling anxious or agitated;  · indigestion, nausea, diarrhea, loss of appetite;  · sweating;  · tremors or shaking;  · sleep problems (insomnia); or  · decreased sex drive, impotence, or difficulty having an orgasm. This is not a complete list of side effects and others may occur. Call your doctor for medical advice about side effects. You may report side effects to FDA at 1-142-FDA-4757. What other drugs will affect sertraline? Taking sertraline with other drugs that make you sleepy can worsen this effect. Ask your doctor before taking a sleeping pill, narcotic medication, muscle relaxer, or medicine for anxiety, depression, or seizures. Other drugs may interact with sertraline, including prescription and over-the-counter medicines, vitamins, and herbal products.  Tell your doctor about all your current medicines and any medicine you start or stop using. Where can I get more information? Your pharmacist can provide more information about sertraline. Remember, keep this and all other medicines out of the reach of children, never share your medicines with others, and use this medication only for the indication prescribed. Every effort has been made to ensure that the information provided by [address] [place], [place] [address]can Dr is accurate, up-to-date, and complete, but no guarantee is made to that effect. Drug information contained herein may be time sensitive. [facility] information has been compiled for use by healthcare practitioners and consumers in the [place] and therefore [facility] does not warrant that uses outside of the [place] are appropriate, unless specifically indicated otherwise. [facility]'s drug information does not endorse drugs, diagnose patients or recommend therapy. [facility]'s drug information is an informational resource designed to assist licensed healthcare practitioners in caring for their patients and/or to serve consumers viewing this service as a supplement to, and not a substitute for, the expertise, skill, knowledge and judgment of healthcare practitioners. The absence of a warning for a given drug or drug combination in no way should be construed to indicate that the drug or drug combination is safe, effective or appropriate for any given patient. [facility] does not assume any responsibility for any aspect of healthcare administered with the aid of information [facility] provides. The information contained herein is not intended to cover all possible uses, directions, precautions, warnings, drug interactions, allergic reactions, or adverse effects. If you have questions about the drugs you are taking, check with your doctor, nurse or pharmacist.  Copyright 4806-1906 [address]. Version: 21.01. Revision date: 8/9/2017. Care instructions adapted under license by [facility] ([facility]).  If you have questions about a medical condition or this instruction, always ask your healthcare professional. [address] any warranty or liability for your use of this information. Patient Education        escitalopram  Pronunciation:  AALIYAH [place]  Brand:  Lexapro  What is the most important information I should know about escitalopram?  You should not use this medicine you also take pimozide or citalopram (Celexa). Do not use escitalopram within 14 days before or 14 days after you have used an MAO inhibitor, such as isocarboxazid, linezolid, methylene blue injection, phenelzine, rasagiline, selegiline, or tranylcypromine. Some young people have thoughts about suicide when first taking an antidepressant. Stay alert to changes in your mood or symptoms. Report any new or worsening symptoms to your doctor. Seek medical attention right away if you have symptoms of serotonin syndrome, such as: agitation, hallucinations, fever, sweating, shivering, fast heart rate, muscle stiffness, twitching, loss of coordination, nausea, vomiting, or diarrhea. What is escitalopram?  Escitalopram is a selective serotonin reuptake inhibitor SSRI antidepressant. Escitalopram is used to treat major depressive disorder in adults and adolescents at least 15years old. Escitalopram is also used to treat anxiety in adults. Escitalopram may also be used for purposes not listed in this medication guide. What should I discuss with my healthcare provider before taking escitalopram?  You should not use this medicine if you are allergic to escitalopram or citalopram (Celexa), or if:  · you also take pimozide or citalopram.  Do not use escitalopram within 14 days before or 14 days after you have used an MAO inhibitor. A dangerous drug interaction could occur. MAO inhibitors include isocarboxazid, linezolid, phenelzine, rasagiline, selegiline, and tranylcypromine.   Be sure your doctor knows if you also take stimulant medicine, opioid medicine, herbal products, or medicine for depression, mental illness, Parkinson's disease, migraine headaches, serious infections, or prevention of nausea and vomiting. These medicines may interact with escitalopram and cause a serious condition called serotonin syndrome. Tell your doctor if you have ever had:  · liver or kidney disease;  · seizures;  · low levels of sodium in your blood;  · heart disease, high blood pressure;  · a stroke;  · bleeding problems;  · bipolar disorder (manic depression); or  · drug addiction or suicidal thoughts. Some young people have thoughts about suicide when first taking an antidepressant. Your doctor should check your progress at regular visits. Your family or other caregivers should also be alert to changes in your mood or symptoms. Escitalopram is not approved for use by anyone younger than 15years old. Ask your doctor about taking this medicine if you are pregnant. Taking an SSRI antidepressant during late pregnancy may cause serious medical complications in the baby. However, you may have a relapse of depression if you stop taking your antidepressant. Tell your doctor right away if you become pregnant. Do not start or stop taking this medicine without your doctor's advice. If you are pregnant, your name may be listed on a pregnancy registry to track the effects of escitalopram on the baby. If you are breastfeeding, tell your doctor if you notice drowsiness, agitation, feeding problems, or poor weight gain in the nursing baby. How should I take escitalopram?  Follow all directions on your prescription label and read all medication guides or instruction sheets. Your doctor may occasionally change your dose. Use the medicine exactly as directed. Take the medicine at the same time each day, with or without food. Measure liquid medicine carefully.  Use the dosing syringe provided, or use a medicine dose-measuring device (not a kitchen radha). It may take up to 4 weeks before your symptoms improve. Keep using the medication as directed and tell your doctor if your symptoms do not improve. Your doctor will need to check your progress on a regular basis. A child taking escitalopram should be checked for height and weight gain. Do not stop using escitalopram suddenly, or you could have unpleasant withdrawal symptoms. Follow your doctor's instructions about tapering your dose. Store at room temperature away from moisture and heat. What happens if I miss a dose? Take the medicine as soon as you can, but skip the missed dose if it is almost time for your next dose. Do not take two doses at one time. What happens if I overdose? Seek emergency medical attention or call the Poison Help line at 1-976.601.2085. What should I avoid while taking escitalopram?  Ask your doctor before taking a nonsteroidal anti-inflammatory drug (NSAID) such as aspirin, ibuprofen (Advil, Motrin), naproxen (Aleve), celecoxib (Celebrex), diclofenac, indomethacin, meloxicam, and others. Using an NSAID with escitalopram may cause you to bruise or bleed easily. Avoid alcohol. Avoid driving or hazardous activity until you know how this medicine will affect you. Your reactions could be impaired. What are the possible side effects of escitalopram?  Get emergency medical help if you have signs of an allergic reaction: skin rash or hives; difficulty breathing; swelling of your face, lips, tongue, or throat. Report any new or worsening symptoms to your doctor, such as: mood or behavior changes, anxiety, panic attacks, trouble sleeping, or if you feel impulsive, irritable, agitated, hostile, aggressive, restless, hyperactive (mentally or physically), more depressed, or have thoughts about suicide or hurting yourself.   Call your doctor at once if you have:  · blurred vision, tunnel vision, eye pain or swelling, or seeing halos around lights;  · racing thoughts, unusual risk-taking behavior, feelings of extreme happiness or sadness;  · pain or burning when you urinate;  · (in a child taking escitalopram) slow growth or weight gain;  · low levels of sodium in the body --headache, confusion, slurred speech, severe weakness, vomiting, loss of coordination, feeling unsteady; or  · severe nervous system reaction --very stiff (rigid) muscles, high fever, sweating, confusion, fast or uneven heartbeats, tremors, feeling like you might pass out. Seek medical attention right away if you have symptoms of serotonin syndrome, such as: agitation, hallucinations, fever, sweating, shivering, fast heart rate, muscle stiffness, twitching, loss of coordination, nausea, vomiting, or diarrhea. Common side effects may include:  · painful urination;  · dizziness, drowsiness, tiredness, weakness;  · feeling anxious or agitated;  · increased muscle movements, feeling shaky;  · sleep problems (insomnia);  · sweating, dry mouth, increased thirst, loss of appetite;  · nausea, constipation;  · yawning;  · nosebleed, heavy menstrual periods; or  · decreased sex drive, impotence, or difficulty having an orgasm. This is not a complete list of side effects and others may occur. Call your doctor for medical advice about side effects. You may report side effects to FDA at 7-642-FDA-1295. What other drugs will affect escitalopram?  Using escitalopram with other drugs that make you drowsy can worsen this effect. Ask your doctor before using opioid medication, a sleeping pill, a muscle relaxer, or medicine for anxiety or seizures. Tell your doctor about all your current medicines, especially a blood thinner such as warfarin, Coumadin, or Jantoven. Many drugs can affect escitalopram, and some drugs should not be used at the same time. Tell your doctor about all your current medicines and any medicine you start or stop using. This includes prescription and over-the-counter medicines, vitamins, and herbal products. Not all possible interactions are listed here. Where can I get more information? Your pharmacist can provide more information about escitalopram.  Remember, keep this and all other medicines out of the reach of children, never share your medicines with others, and use this medication only for the indication prescribed. Every effort has been made to ensure that the information provided by [address] is accurate, up-to-date, and complete, but no guarantee is made to that effect. Drug information contained herein may be time sensitive. [facility] information has been compiled for use by healthcare practitioners and consumers in the [place] and therefore [facility] does not warrant that uses outside of the [place] are appropriate, unless specifically indicated otherwise. [facility]'s drug information does not endorse drugs, diagnose patients or recommend therapy. [facility]'s drug information is an informational resource designed to assist licensed healthcare practitioners in caring for their patients and/or to serve consumers viewing this service as a supplement to, and not a substitute for, the expertise, skill, knowledge and judgment of healthcare practitioners. The absence of a warning for a given drug or drug combination in no way should be construed to indicate that the drug or drug combination is safe, effective or appropriate for any given patient. [facility] does not assume any responsibility for any aspect of healthcare administered with the aid of information [facility] provides. The information contained herein is not intended to cover all possible uses, directions, precautions, warnings, drug interactions, allergic reactions, or adverse effects. If you have questions about the drugs you are taking, check with your doctor, nurse or pharmacist.  Copyright 0236-3593 [address]: 17.01. Revision date: 11/5/2020. Care instructions adapted under license by [facility] ([facility]).  If you have questions about a medical condition or this instruction, always ask your healthcare professional. [address] any warranty or liability for your use of this information. Patient Education        Recovering From Depression: Care Instructions  Your Care Instructions     Taking good care of yourself is important as you recover from depression. In time, your symptoms will fade as your treatment takes hold. Do not give up. Instead, focus your energy on getting better. Your mood will improve. It just takes some time. Focus on things that can help you feel better, such as being with friends and family, eating well, and getting enough rest. But take things slowly. Do not do too much too soon. You will begin to feel better gradually. Follow-up care is a key part of your treatment and safety. Be sure to make and go to all appointments, and call your doctor if you are having problems. It's also a good idea to know your test results and keep a list of the medicines you take. How can you care for yourself at home? Be realistic  · If you have a large task to do, break it up into smaller steps you can handle, and just do what you can. · You may want to put off important decisions until your depression has lifted. If you have plans that will have a major impact on your life, such as marriage, divorce, or a job change, try to wait a bit. Talk it over with friends and loved ones who can help you look at the overall picture first.  · Reaching out to people for help is important. Do not isolate yourself. Let your family and friends help you. Find someone you can trust and confide in, and talk to that person. · Be patient, and be kind to yourself. Remember that depression is not your fault and is not something you can overcome with willpower alone. Treatment is important for depression, just like for any other illness. Feeling better takes time, and your mood will improve little by little.   Stay active  · Stay busy and get outside. Take a walk, or try some other light exercise. · Talk with your doctor about an exercise program. Exercise can help with mild depression. · Go to a movie or concert. Take part in a Hindu activity or other social gathering. Go to a ball game. · Ask a friend to have dinner with you. Take care of yourself  · Eat a balanced diet with plenty of fresh fruits and vegetables, whole grains, and lean protein. If you have lost your appetite, eat small snacks rather than large meals. · Avoid using illegal drugs or marijuana and drinking alcohol. Do not take medicines that have not been prescribed for you. They may interfere with medicines you may be taking for depression, or they may make your depression worse. · Take your medicines exactly as they are prescribed. You may start to feel better within 1 to 3 weeks of taking antidepressant medicine. But it can take as many as 6 to 8 weeks to see more improvement. If you have questions or concerns about your medicines, or if you do not notice any improvement by 3 weeks, talk to your doctor. · Continue to take your medicine after your symptoms improve. Taking your medicine for at least 6 months after you feel better can help keep you from getting depressed again. If this isn't the first time you have been depressed, your doctor may recommend you to take medicine even longer. · If you have any side effects from your medicine, tell your doctor. Many side effects are mild and will go away on their own after you have been taking the medicine for a few weeks. Some may last longer. Talk to your doctor if side effects are bothering you too much. You might be able to try a different medicine. · Continue counseling. It may help prevent depression from returning, especially if you've had multiple episodes of depression. Talk with your counselor if you are having a hard time attending your sessions or you think the sessions aren't working.  Don't just stop going. · Get enough sleep. Talk to your doctor if you are having problems sleeping. · Avoid sleeping pills unless they are prescribed by the doctor treating your depression. Sleeping pills may make you groggy during the day, and they may interact with other medicine you are taking. · If you have any other illnesses, such as diabetes, heart disease, or high blood pressure, make sure to continue with your treatment. Tell your doctor about all of the medicines you take, including those with or without a prescription. · If you or someone you know talks about suicide, self-harm, or feeling hopeless, get help right away. Call the [address] [place] [address] at 1-378-285-BUQA (7-579.308.4125) or text HOME to 685905 to access the [employer] Line. Consider saving these numbers in your phone. When should you call for help? Call 911 anytime you think you may need emergency care. For example, call if:    · You feel like hurting yourself or someone else.     · Someone you know has depression and is about to attempt or is attempting suicide. Call your doctor now or seek immediate medical care if:    · You hear voices.     · Someone you know has depression and:  ? Starts to give away his or her possessions. ? Uses illegal drugs or drinks alcohol heavily. ? Talks or writes about death, including writing suicide notes or talking about guns, knives, or pills. ? Starts to spend a lot of time alone. ? Acts very aggressively or suddenly appears calm. Watch closely for changes in your health, and be sure to contact your doctor if:    · You do not get better as expected. Where can you learn more? Go to https://[employer]ARAL[employer]. org and sign in to your [employer] account. Enter H064 in the [employer] box to learn more about \"Recovering From Depression: Care Instructions. \"     If you do not have an account, please click on the \"Sign Up Now\" link.   Current as of: September 23, 2020               Content Version: 12.9  © 5595-3764 Healthwise, [employer]. Care instructions adapted under license by [facility] ([facility]). If you have questions about a medical condition or this instruction, always ask your healthcare professional. [address] any warranty or liability for your use of this information. pt reported weakness in right arm, hand

## 2022-03-09 VITALS
OXYGEN SATURATION: 100 % | SYSTOLIC BLOOD PRESSURE: 126 MMHG | RESPIRATION RATE: 18 BRPM | HEART RATE: 72 BPM | DIASTOLIC BLOOD PRESSURE: 79 MMHG

## 2022-03-09 LAB
ANION GAP SERPL CALC-SCNC: 6 MMOL/L — SIGNIFICANT CHANGE UP (ref 5–17)
BUN SERPL-MCNC: 11 MG/DL — SIGNIFICANT CHANGE UP (ref 7–23)
CALCIUM SERPL-MCNC: 9.2 MG/DL — SIGNIFICANT CHANGE UP (ref 8.5–10.1)
CHLORIDE SERPL-SCNC: 106 MMOL/L — SIGNIFICANT CHANGE UP (ref 96–108)
CO2 SERPL-SCNC: 27 MMOL/L — SIGNIFICANT CHANGE UP (ref 22–31)
CREAT SERPL-MCNC: 0.72 MG/DL — SIGNIFICANT CHANGE UP (ref 0.5–1.3)
CULTURE RESULTS: SIGNIFICANT CHANGE UP
EGFR: 102 ML/MIN/1.73M2 — SIGNIFICANT CHANGE UP
GLUCOSE SERPL-MCNC: 96 MG/DL — SIGNIFICANT CHANGE UP (ref 70–99)
HCT VFR BLD CALC: 37.9 % — SIGNIFICANT CHANGE UP (ref 34.5–45)
HGB BLD-MCNC: 12.7 G/DL — SIGNIFICANT CHANGE UP (ref 11.5–15.5)
MCHC RBC-ENTMCNC: 30.1 PG — SIGNIFICANT CHANGE UP (ref 27–34)
MCHC RBC-ENTMCNC: 33.5 G/DL — SIGNIFICANT CHANGE UP (ref 32–36)
MCV RBC AUTO: 89.8 FL — SIGNIFICANT CHANGE UP (ref 80–100)
NRBC # BLD: 0 /100 WBCS — SIGNIFICANT CHANGE UP (ref 0–0)
PLATELET # BLD AUTO: 302 K/UL — SIGNIFICANT CHANGE UP (ref 150–400)
POTASSIUM SERPL-MCNC: 3.8 MMOL/L — SIGNIFICANT CHANGE UP (ref 3.5–5.3)
POTASSIUM SERPL-SCNC: 3.8 MMOL/L — SIGNIFICANT CHANGE UP (ref 3.5–5.3)
RBC # BLD: 4.22 M/UL — SIGNIFICANT CHANGE UP (ref 3.8–5.2)
RBC # FLD: 12.8 % — SIGNIFICANT CHANGE UP (ref 10.3–14.5)
SODIUM SERPL-SCNC: 139 MMOL/L — SIGNIFICANT CHANGE UP (ref 135–145)
SPECIMEN SOURCE: SIGNIFICANT CHANGE UP
WBC # BLD: 5.91 K/UL — SIGNIFICANT CHANGE UP (ref 3.8–10.5)
WBC # FLD AUTO: 5.91 K/UL — SIGNIFICANT CHANGE UP (ref 3.8–10.5)

## 2022-03-09 PROCEDURE — 99239 HOSP IP/OBS DSCHRG MGMT >30: CPT

## 2022-03-09 RX ORDER — ASPIRIN/CALCIUM CARB/MAGNESIUM 324 MG
1 TABLET ORAL
Qty: 30 | Refills: 1
Start: 2022-03-09 | End: 2022-05-07

## 2022-03-09 RX ORDER — ASPIRIN/CALCIUM CARB/MAGNESIUM 324 MG
1 TABLET ORAL
Qty: 0 | Refills: 0 | DISCHARGE
Start: 2022-03-09

## 2022-03-09 RX ORDER — ATORVASTATIN CALCIUM 80 MG/1
1 TABLET, FILM COATED ORAL
Qty: 30 | Refills: 0
Start: 2022-03-09

## 2022-03-09 RX ADMIN — Medication 81 MILLIGRAM(S): at 11:39

## 2022-03-09 NOTE — DISCHARGE NOTE PROVIDER - NSDCCPCAREPLAN_GEN_ALL_CORE_FT
PRINCIPAL DISCHARGE DIAGNOSIS  Diagnosis: Brain TIA  Assessment and Plan of Treatment:       SECONDARY DISCHARGE DIAGNOSES  Diagnosis: Hypothyroidism  Assessment and Plan of Treatment:     Diagnosis: Cervical spinal stenosis  Assessment and Plan of Treatment:

## 2022-03-09 NOTE — DISCHARGE NOTE PROVIDER - NSDCFUADDINST_GEN_ALL_CORE_FT
It is important to see your primary physician as well as any specialty physicians within the next week to perform a comprehensive medical review.  Call their offices for an appointment as soon as you leave the hospital.  You will also need to see them for renewal of your medications.  If have any difficulty following with a physician, contact the [facility] [phone] or via [email or web address].   To obtain your results, you can access the [email or web address] Patient Portal at [email or web address].  Your medical issues appear to be stable at this time, but if your symptoms recur or worsen, contact your physicians and/or return to the hospital if necessary.  If you encounter any issues or questions with your medication, call your physicians before stopping the medication.  Do not drive.  Limit your diet to 2 grams of sodium daily.

## 2022-03-09 NOTE — DISCHARGE NOTE PROVIDER - HOSPITAL COURSE
51F PMHx of hypothyroidism presenting with RUE and RLE weakness as well as decreased sensation on R side of body and face.  Symptoms slightly improved.  CT Head, CTA Head and neck without acute pathology.  MRI not suggestive of CVA.      # R sided weakness, ? TIA, ? atypical Migraine- CT, MRI not suggestive of CVA.  Neuro input appreciated.  MR C-spine showed multilevel spinal stenosis.  d/w Dr. Del Valle.  ASA, Statin.  I consulted ortho, recommended Lspine MRI, however, pt refuses, wishes for further outpatient workup.  Risks, benefits and alternatives reviewed with pt, daughter, in agreement.   # Hypothyroidism - Synthroid 50 mcg.  # Inpatient DVT Prophylaxis - Lovenox subcut      Disposition: Stable for discharge.  Outpatient followup discussed.  Total time spent on discharge is  35  minutes.

## 2022-03-09 NOTE — PROGRESS NOTE ADULT - SUBJECTIVE AND OBJECTIVE BOX
Patient: LIANA JHA [id number] 51y Female                            Hospitalist Attending Note    Seen with daughter at bedside.  No new complaints.  R sided weakness much better.  No headache / back pain.     ____________________PHYSICAL EXAM:  GENERAL:  NAD Alert and Oriented x 3   HEENT: NCAT  CARDIOVASCULAR:  S1, S2  LUNGS: CTAB  ABDOMEN:  soft, (-) tenderness, (-) distension, (+) bowel sounds, (-) guarding, (-) rebound (-) rigidity  EXTREMITIES:  no cyanosis / clubbing / edema.   NEURO: subtle R facial droop.  RUE strength 4/5, RLE strength 5-/5.  LUE, LLE strength 5/5.   ____________________        TTE   1. Left ventricular ejection fraction, by visual estimation, is 60 to   65%.   2. Technically good study.   3. Normal global left ventricular systolic function.   4. Normal left ventricular internal cavity size.   5. Normal right ventricular size and function.   6. Normal left atrial size.   7. Normal right atrial size.   8. There is no evidence of pericardial effusion.   9. No evidence of mitral valve regurgitation.  10. Structurally normal mitral valve, with normal leaflet excursion.  11. Normal trileaflet aortic valve with normal opening.  12. Increased relative wall thickness with normal mass index consistent   with left ventricular concentric remodeling.  13. Intact intra-atrial septum without shunt.        MRI:  PROCEDURE DATE:  03/08/2022    INTERPRETATION:  CLINICAL STATEMENT: Right hand weakness  TECHNIQUE: MRI of the cervical spine was performed without gadolinium.  COMPARISON: None.  FINDINGS:  The vertebral body heights and alignment are maintained.  There is no cord compression or abnormal cord edema.  Multilevel degenerative disc disease with loss of signal.  C2-3: Mild disc bulge noted without significant spinal canal stenosis and neural foraminal narrowing.  C3-4: Mild disc bulge and mild facet/uncovertebral hypertrophy noted   resulting in mild effacement of ventral thecal sac. Mild right neural   foraminal narrowing.    C4-5: Disc bulge and mild facet/uncovertebral hypertrophy noted resulting   in mild spinal canal stenosis, flattening of the ventral cord. Mild   bilateral neural foraminal narrowing.    C5-C6: Disc bulge and facet/uncovertebral hypertrophy noted resulting in   mild spinal canal stenosis and flattening of the ventral cord. Mild to   moderate bilateral neural foraminal narrowing. There is focal posterior   annular tear.    C6-7: Mild disc bulge and facet/uncovertebral hypertrophy noted resulting   in effacement of ventral thecal sac. Moderate to severe left neural   foraminal narrowing.    Incidental tiny nerve root cyst right neural foramen at C7-T1      IMPRESSION:  Multilevel degenerative changes resulting in multilevel spinal canal   stenosis and neural foraminal narrowing as described above.                           Patient: LIANA JHA [id number] 51y Female                            Hospitalist Attending Note    Seen with daughter at bedside.  No new complaints.  R sided weakness much better.  No headache / back pain.     ____________________PHYSICAL EXAM:  GENERAL:  NAD Alert and Oriented x 3   HEENT: NCAT  CARDIOVASCULAR:  S1, S2  LUNGS: CTAB  ABDOMEN:  soft, (-) tenderness, (-) distension, (+) bowel sounds, (-) guarding, (-) rebound (-) rigidity  EXTREMITIES:  no cyanosis / clubbing / edema.   NEURO: subtle R facial droop.  RUE strength 4/5, RLE strength 5-/5.  LUE, LLE strength 5/5.   ____________________    VITALS:  Vital Signs Last 24 Hrs  T(C): 36.6 (09 Mar 2022 16:05), Max: 36.6 (09 Mar 2022 10:48)  T(F): 97.9 (09 Mar 2022 16:05), Max: 97.9 (09 Mar 2022 10:48)  HR: 72 (09 Mar 2022 17:04) (63 - 83)  BP: 126/79 (09 Mar 2022 17:04) (100/67 - 128/84)  BP(mean): --  RR: 18 (09 Mar 2022 17:04) (18 - 18)  SpO2: 100% (09 Mar 2022 17:04) (98% - 100%) Daily     Daily Weight in k.6 (09 Mar 2022 05:06)  CAPILLARY BLOOD GLUCOSE        I&O's Summary    08 Mar 2022 07:01  -  09 Mar 2022 07:00  --------------------------------------------------------  IN: 360 mL / OUT: 0 mL / NET: 360 mL        LABS:                        12.7   5.91  )-----------( 302      ( 09 Mar 2022 07:15 )             37.9     03-09    139  |  106  |  11  ----------------------------<  96  3.8   |  27  |  0.72    Ca    9.2      09 Mar 2022 07:15    TPro  7.5  /  Alb  3.4  /  TBili  0.4  /  DBili  x   /  AST  21  /  ALT  21  /  AlkPhos  97  03-08    PT/INR - ( 07 Mar 2022 20:25 )   PT: 11.6 sec;   INR: 0.97 ratio         PTT - ( 07 Mar 2022 20:25 )  PTT:34.9 sec  LIVER FUNCTIONS - ( 08 Mar 2022 06:56 )  Alb: 3.4 g/dL / Pro: 7.5 gm/dL / ALK PHOS: 97 U/L / ALT: 21 U/L / AST: 21 U/L / GGT: x           Urinalysis Basic - ( 07 Mar 2022 22:26 )    Color: Yellow / Appearance: Clear / S.005 / pH: x  Gluc: x / Ketone: Negative  / Bili: Negative / Urobili: Negative mg/dL   Blood: x / Protein: Negative mg/dL / Nitrite: Negative   Leuk Esterase: Trace / RBC: 0-2 /HPF / WBC 0-2   Sq Epi: x / Non Sq Epi: Occasional / Bacteria: Occasional            Culture - Urine (collected 08 Mar 2022 08:53)  Source: Clean Catch Clean Catch (Midstream)  Preliminary Report (09 Mar 2022 13:51):    50,000 - 99,000 CFU/mL Gram positive organisms    <10,000 CFU/ml Normal Urogenital mikal present        MEDICATIONS:  acetaminophen     Tablet .. 650 milliGRAM(s) Oral every 6 hours PRN  aluminum hydroxide/magnesium hydroxide/simethicone Suspension 30 milliLiter(s) Oral every 4 hours PRN  aspirin  chewable 81 milliGRAM(s) Oral daily  atorvastatin 40 milliGRAM(s) Oral at bedtime  enoxaparin Injectable 40 milliGRAM(s) SubCutaneous every 24 hours  melatonin 3 milliGRAM(s) Oral at bedtime PRN  ondansetron Injectable 4 milliGRAM(s) IV Push every 8 hours PRN        TTE   1. Left ventricular ejection fraction, by visual estimation, is 60 to   65%.   2. Technically good study.   3. Normal global left ventricular systolic function.   4. Normal left ventricular internal cavity size.   5. Normal right ventricular size and function.   6. Normal left atrial size.   7. Normal right atrial size.   8. There is no evidence of pericardial effusion.   9. No evidence of mitral valve regurgitation.  10. Structurally normal mitral valve, with normal leaflet excursion.  11. Normal trileaflet aortic valve with normal opening.  12. Increased relative wall thickness with normal mass index consistent   with left ventricular concentric remodeling.  13. Intact intra-atrial septum without shunt.        MRI:  PROCEDURE DATE:  2022    INTERPRETATION:  CLINICAL STATEMENT: Right hand weakness  TECHNIQUE: MRI of the cervical spine was performed without gadolinium.  COMPARISON: None.  FINDINGS:  The vertebral body heights and alignment are maintained.  There is no cord compression or abnormal cord edema.  Multilevel degenerative disc disease with loss of signal.  C2-3: Mild disc bulge noted without significant spinal canal stenosis and neural foraminal narrowing.  C3-4: Mild disc bulge and mild facet/uncovertebral hypertrophy noted   resulting in mild effacement of ventral thecal sac. Mild right neural   foraminal narrowing.    C4-5: Disc bulge and mild facet/uncovertebral hypertrophy noted resulting   in mild spinal canal stenosis, flattening of the ventral cord. Mild   bilateral neural foraminal narrowing.    C5-C6: Disc bulge and facet/uncovertebral hypertrophy noted resulting in   mild spinal canal stenosis and flattening of the ventral cord. Mild to   moderate bilateral neural foraminal narrowing. There is focal posterior   annular tear.    C6-7: Mild disc bulge and facet/uncovertebral hypertrophy noted resulting   in effacement of ventral thecal sac. Moderate to severe left neural   foraminal narrowing.    Incidental tiny nerve root cyst right neural foramen at C7-T1      IMPRESSION:  Multilevel degenerative changes resulting in multilevel spinal canal   stenosis and neural foraminal narrowing as described above.

## 2022-03-09 NOTE — DISCHARGE NOTE PROVIDER - CARE PROVIDER_API CALL
Sandro Del Valle)  Clinical Neurophysiology; Neurology  [address]  [place] [address]  Phone: [phone]  Fax: [phone]  Follow Up Time:     Yani Butcher (DO)  Orthopaedic Surgery Surgery  [address] [facility] [address]  [place] [address]  Phone: [phone]  Fax: [phone]  Follow Up Time:

## 2022-03-09 NOTE — DISCHARGE NOTE NURSING/CASE MANAGEMENT/SOCIAL WORK - PATIENT PORTAL LINK FT
You can access the FollowMyHealth Patient Portal offered by [facility] by registering at the following website: [email or web address]. By joining [facility]’s FollowMyHealth portal, you will also be able to view your health information using other applications (apps) compatible with our system.

## 2022-03-09 NOTE — DISCHARGE NOTE NURSING/CASE MANAGEMENT/SOCIAL WORK - NSDCPEFALRISK_GEN_ALL_CORE
For information on Fall & Injury Prevention, visit: [email or web address] OR  [email or web address] OR  https://www.cdc.gov/steadi/patient.html

## 2022-03-09 NOTE — PROGRESS NOTE ADULT - ASSESSMENT
51F PMHx of hypothyroidism presenting with RUE and RLE weakness as well as decreased sensation on R side of body and face.  Symptoms slightly improved.  CT Head, CTA Head and neck without acute pathology.  MRI not suggestive of CVA.      # R sided weakness, ? TIA, ? atypical Migraine- CT, MRI not suggestive of CVA.  Neuro input appreciated.  MR C-spine showed multilevel spinal stenosis.  d/w Dr. Del Valle.  ASA, Statin.  I consulted ortho, recommended Lspine MRI, however, pt refuses, wishes for further outpatient workup.  Risks, benefits and alternatives reviewed with pt, daughter, in agreement.   # Hypothyroidism - Synthroid 50 mcg.  # Inpatient DVT Prophylaxis - Lovenox subcut

## 2022-03-09 NOTE — DISCHARGE NOTE PROVIDER - NSDCMRMEDTOKEN_GEN_ALL_CORE_FT
aspirin 81 mg oral tablet, chewable: 1 tab(s) orally once a day  atorvastatin 40 mg oral tablet: 1 tab(s) orally once a day (at bedtime)

## 2022-03-09 NOTE — CONSULT NOTE ADULT - SUBJECTIVE AND OBJECTIVE BOX
Patient is a 51yFemale RHD community ambulator without assistive devices who presents sp RUE weakness and R facial numbness 2 days ago. Patient states that she was watching TV at home when symptoms started and that symptoms lasted approximately 24hrs. Patient denies pain and radicular symptoms. Denies fall/trauma/HS/LOC. States ability to ambulate following symptom onset. Denies pain down legs, denies numbness/tingling/paresthesias/weakness, denies incontinence of bowel/bladder. Denies any previous orthopaedic history. No other orthopaedic concerns at this time.       PAST MEDICAL & SURGICAL HISTORY:      Vital Signs Last 24 Hrs  T(C): 36.6 (09 Mar 2022 16:05), Max: 36.8 (08 Mar 2022 16:51)  T(F): 97.9 (09 Mar 2022 16:05), Max: 98.2 (08 Mar 2022 16:51)  HR: 71 (09 Mar 2022 16:05) (63 - 83)  BP: 100/67 (09 Mar 2022 16:05) (100/67 - 134/84)  BP(mean): --  RR: 18 (09 Mar 2022 16:05) (18 - 18)  SpO2: 99% (09 Mar 2022 16:05) (97% - 99%)  I&O's Detail    08 Mar 2022 07:01  -  09 Mar 2022 07:00  --------------------------------------------------------  IN:    Oral Fluid: 360 mL  Total IN: 360 mL    OUT:  Total OUT: 0 mL    Total NET: 360 mL          LABS:                        12.7   5.91  )-----------( 302      ( 09 Mar 2022 07:15 )             37.9     03-09    139  |  106  |  11  ----------------------------<  96  3.8   |  27  |  0.72    Ca    9.2      09 Mar 2022 07:15    TPro  7.5  /  Alb  3.4  /  TBili  0.4  /  DBili  x   /  AST  21  /  ALT  21  /  AlkPhos  97  03-08    PT/INR - ( 07 Mar 2022 20:25 )   PT: 11.6 sec;   INR: 0.97 ratio         PTT - ( 07 Mar 2022 20:25 )  PTT:34.9 sec  Urinalysis Basic - ( 07 Mar 2022 22:26 )    Color: Yellow / Appearance: Clear / S.005 / pH: x  Gluc: x / Ketone: Negative  / Bili: Negative / Urobili: Negative mg/dL   Blood: x / Protein: Negative mg/dL / Nitrite: Negative   Leuk Esterase: Trace / RBC: 0-2 /HPF / WBC 0-2   Sq Epi: x / Non Sq Epi: Occasional / Bacteria: Occasional        PHYSICAL EXAM:  General; Awake and alert, Oriented x 3  Spine: No TTP over spinous processes or paraspinal muscles at C/T/L spine. No palpable step off.     Able to actively SLR BL. No pain to passive SLR   Ambulating w/o assistance/pain     No saddle anesthesia        Negative Finger Escape Test   Negative -release Test            Motor exam:        Elbow Flex (C5)      Wrist Ext (C6)       Elbow Ext (C7)       Finger Flex (C8)       Finger ABd (T1)   R           5/5                           5/5                         5/5                           5/5                            5/5  L           5/5                            5/5                         5/5                           5/5                           5/5         Hip Flex (L2)        Knee Ext (L3)       Ankle Dorsi (L4)       Great Toe Ext (L5)      Ankle Plantar (S1)  R        5/5                          5/5                        5/5                              5/5                               5/5  L         5/5                          5/5                        5/5                              5/5                              5/5    Sensory:  (0=absent, 1=impaired, 2=normal, NT=not testable)      C5    C6   C7   C8   T1         R   2     2      2     2      2  L    2     2      2     2      2        L2    L3   L4   L5   S1   R   2     2     2     2     2  L    2     2     2     2     2    Right Upper extremity:   Negative Orozco  +Rad Pulse  Compartments soft and compressible    Left Upper extremity:   Negative Orozco  +Rad Pulse  Compartments soft and compressible    Right Lower Extremity:   SILT L2-S1  Negative Clonus  Negative Babinski  +DP  Compartments soft and compressible           Left Lower Extremity:  SILT L2-S1  Negative Clonus   Negative Babinski  +DP  Compartments soft and compressible    Secondary  Skin intact. No erythema/ecchymosis. No TTP over bony prominences, SILT, palpable pulses, full/painless A/PROM, compartments soft. No other injuries or complaints. Negative logroll/heelstrike BL.     Imaging: MRI Cspine  C2-3: Mild disc bulge noted without significant spinal canal stenosis and neural foraminal narrowing.  C3-4: Mild disc bulge and mild facet/uncovertebral hypertrophy noted resulting in mild effacement of ventral thecal sac. Mild right neural foraminal narrowing.  C4-5: Disc bulge and mild facet/uncovertebral hypertrophy noted resulting in mild spinal canal stenosis, flattening of the ventral cord. Mild bilateral neural foraminal narrowing.  C5-C6: Disc bulge and facet/uncovertebral hypertrophy noted resulting in mild spinal canal stenosis and flattening of the ventral cord. Mild to moderate bilateral neural foraminal narrowing. There is focal posterior annular tear.  C6-7: Mild disc bulge and facet/uncovertebral hypertrophy noted resulting in effacement of ventral thecal sac. Moderate to severe left neural foraminal narrowing.  Incidental tiny nerve root cyst right neural foramen at C7-T1                                               A/P :   Patient is a 51yFemale w/ Multilevel degenerative cervical spine changes resulting in multilevel spinal canal stenosis and neural foraminal narrowing     -Clinical presentation and physical exam are not consistent w/ acute cord compression/cauda equina. Does not demonstrate red flag symptoms such as bowel/bladder incontinence, saddle anesthesia, fevers/chills, or weight loss.    -Given chronic appearing nature of patients cervical degeneration, patients acute symptoms which resolved within 24hrs are more consistent with TIA.   -In the setting of possible TIA, plan for conservative management of patients spine. Recommend full cardiac and neurologic workup including imaging of BL carotid arteries. After this has been completed, may consider patient a candidate for ACDF in 90 days if she is symptomatic, however patient is currently without symptoms of cervical stenosis or radiculopathy.  -Patient was extensively educated about the signs and symptoms of osteomyelitis, epidural abscess, discitis, acute cord compression. The patient was advised to return to the ED should she develop neurological symptoms such as weakness, numbness/tingling/paresthesias, worsening pain, bowel/bladder incontinence, or fevers/chills.    -Recommend follow up w/ [facility] outpatient in 1-2 weeks. Call office to schedule appointment.  -All patient's questions answered. Patient understands and agrees w/ above plan.    -Imaging and clinical presentation discussed w/ Dr. Butcher who is aware and agrees w/ above plan.     Patient is a 51yFemale RHD community ambulator without assistive devices who presents sp RUE weakness and R facial numbness 2 days ago. Patient states that she was watching TV at home when symptoms started and that symptoms lasted approximately 24hrs. Patient denies pain and radicular symptoms. Denies fall/trauma/HS/LOC. States ability to ambulate following symptom onset. Denies pain down legs, denies numbness/tingling/paresthesias/weakness, denies incontinence of bowel/bladder. Denies any previous orthopaedic history. No other orthopaedic concerns at this time.       PAST MEDICAL & SURGICAL HISTORY:      Vital Signs Last 24 Hrs  T(C): 36.6 (09 Mar 2022 16:05), Max: 36.8 (08 Mar 2022 16:51)  T(F): 97.9 (09 Mar 2022 16:05), Max: 98.2 (08 Mar 2022 16:51)  HR: 71 (09 Mar 2022 16:05) (63 - 83)  BP: 100/67 (09 Mar 2022 16:05) (100/67 - 134/84)  BP(mean): --  RR: 18 (09 Mar 2022 16:05) (18 - 18)  SpO2: 99% (09 Mar 2022 16:05) (97% - 99%)  I&O's Detail    08 Mar 2022 07:01  -  09 Mar 2022 07:00  --------------------------------------------------------  IN:    Oral Fluid: 360 mL  Total IN: 360 mL    OUT:  Total OUT: 0 mL    Total NET: 360 mL          LABS:                        12.7   5.91  )-----------( 302      ( 09 Mar 2022 07:15 )             37.9     03-09    139  |  106  |  11  ----------------------------<  96  3.8   |  27  |  0.72    Ca    9.2      09 Mar 2022 07:15    TPro  7.5  /  Alb  3.4  /  TBili  0.4  /  DBili  x   /  AST  21  /  ALT  21  /  AlkPhos  97  03-08    PT/INR - ( 07 Mar 2022 20:25 )   PT: 11.6 sec;   INR: 0.97 ratio         PTT - ( 07 Mar 2022 20:25 )  PTT:34.9 sec  Urinalysis Basic - ( 07 Mar 2022 22:26 )    Color: Yellow / Appearance: Clear / S.005 / pH: x  Gluc: x / Ketone: Negative  / Bili: Negative / Urobili: Negative mg/dL   Blood: x / Protein: Negative mg/dL / Nitrite: Negative   Leuk Esterase: Trace / RBC: 0-2 /HPF / WBC 0-2   Sq Epi: x / Non Sq Epi: Occasional / Bacteria: Occasional        PHYSICAL EXAM:  General; Awake and alert, Oriented x 3  Spine: No TTP over spinous processes or paraspinal muscles at C/T/L spine. No palpable step off.     Able to actively SLR BL. No pain to passive SLR   Ambulating w/o assistance/pain     No saddle anesthesia        Negative Finger Escape Test   Negative -release Test            Motor exam:        Elbow Flex (C5)      Wrist Ext (C6)       Elbow Ext (C7)       Finger Flex (C8)       Finger ABd (T1)   R           5/5                           5/5                         5/5                           5/5                            5/5  L           5/5                            5/5                         5/5                           5/5                           5/5         Hip Flex (L2)        Knee Ext (L3)       Ankle Dorsi (L4)       Great Toe Ext (L5)      Ankle Plantar (S1)  R        5/5                          5/5                        5/5                              5/5                               5/5  L         5/5                          5/5                        5/5                              5/5                              5/5    Sensory:  (0=absent, 1=impaired, 2=normal, NT=not testable)      C5    C6   C7   C8   T1         R   2     2      2     2      2  L    2     2      2     2      2        L2    L3   L4   L5   S1   R   2     2     2     2     2  L    2     2     2     2     2    Right Upper extremity:   Negative Orozco  +Rad Pulse  Compartments soft and compressible    Left Upper extremity:   Negative Orozco  +Rad Pulse  Compartments soft and compressible    Right Lower Extremity:   SILT L2-S1  Negative Clonus  Negative Babinski  +DP  Compartments soft and compressible           Left Lower Extremity:  SILT L2-S1  Negative Clonus   Negative Babinski  +DP  Compartments soft and compressible    Secondary  Skin intact. No erythema/ecchymosis. No TTP over bony prominences, SILT, palpable pulses, full/painless A/PROM, compartments soft. No other injuries or complaints. Negative logroll/heelstrike BL.     Imaging: MRI Cspine  C2-3: Mild disc bulge noted without significant spinal canal stenosis and neural foraminal narrowing.  C3-4: Mild disc bulge and mild facet/uncovertebral hypertrophy noted resulting in mild effacement of ventral thecal sac. Mild right neural foraminal narrowing.  C4-5: Disc bulge and mild facet/uncovertebral hypertrophy noted resulting in mild spinal canal stenosis, flattening of the ventral cord. Mild bilateral neural foraminal narrowing.  C5-C6: Disc bulge and facet/uncovertebral hypertrophy noted resulting in mild spinal canal stenosis and flattening of the ventral cord. Mild to moderate bilateral neural foraminal narrowing. There is focal posterior annular tear.  C6-7: Mild disc bulge and facet/uncovertebral hypertrophy noted resulting in effacement of ventral thecal sac. Moderate to severe left neural foraminal narrowing.  Incidental tiny nerve root cyst right neural foramen at C7-T1                                               A/P :   Patient is a 51yFemale w/ Multilevel degenerative cervical spine changes resulting in multilevel spinal canal stenosis and neural foraminal narrowing     -Clinical presentation and physical exam are not consistent w/ acute cord compression/cauda equina. Does not demonstrate red flag symptoms such as bowel/bladder incontinence, saddle anesthesia, fevers/chills, or weight loss.    -Given chronic appearing nature of patients cervical degeneration, patients acute symptoms which resolved within 24hrs are more consistent with TIA.   -In the setting of possible TIA, plan for conservative management of patients spine. Recommend full cardiac and neurologic workup including imaging of BL carotid arteries. After this has been completed, may consider patient a candidate for ACDF in 90 days if she is symptomatic, however patient is currently without symptoms of cervical stenosis or radiculopathy.  -Patient was extensively educated about the signs and symptoms of osteomyelitis, epidural abscess, discitis, acute cord compression. The patient was advised to return to the ED should she develop neurological symptoms such as weakness, numbness/tingling/paresthesias, worsening pain, bowel/bladder incontinence, or fevers/chills.  -Patient is orthostable for DC.   -Recommend follow up w/ [facility] outpatient in 1-2 weeks. Call office to schedule appointment.  -All patient's questions answered. Patient understands and agrees w/ above plan.  -Imaging and clinical presentation discussed w/ Dr. Butcher who is aware and agrees w/ above plan.

## 2022-03-09 NOTE — DISCHARGE NOTE PROVIDER - NSDCFUADDAPPT_GEN_ALL_CORE_FT
Your MRI showed multilevel cervical spinal stenosis.  During your hospitalization, you had abnormal findings on radiologic / laboratory studies, partly described above.  Please see your primary doctor for followup of these findings to ensure that they have resolved.  You may require further evaluation to exclude certain serious conditions and / or treatment.

## 2022-03-11 DIAGNOSIS — R42 DIZZINESS AND GIDDINESS: ICD-10-CM

## 2022-03-11 DIAGNOSIS — M48.02 SPINAL STENOSIS, CERVICAL REGION: ICD-10-CM

## 2022-03-11 DIAGNOSIS — E03.9 HYPOTHYROIDISM, UNSPECIFIED: ICD-10-CM

## 2022-03-11 DIAGNOSIS — G45.9 TRANSIENT CEREBRAL ISCHEMIC ATTACK, UNSPECIFIED: ICD-10-CM

## 2022-03-11 DIAGNOSIS — I63.9 CEREBRAL INFARCTION, UNSPECIFIED: ICD-10-CM

## 2022-03-11 DIAGNOSIS — Z82.69 FAMILY HISTORY OF OTHER DISEASES OF THE MUSCULOSKELETAL SYSTEM AND CONNECTIVE TISSUE: ICD-10-CM

## 2022-03-11 DIAGNOSIS — Z53.20 PROCEDURE AND TREATMENT NOT CARRIED OUT BECAUSE OF PATIENT'S DECISION FOR UNSPECIFIED REASONS: ICD-10-CM

## 2022-03-11 DIAGNOSIS — R51.9 HEADACHE, UNSPECIFIED: ICD-10-CM

## 2022-03-11 DIAGNOSIS — R29.810 FACIAL WEAKNESS: ICD-10-CM

## 2022-03-11 DIAGNOSIS — G81.91 HEMIPLEGIA, UNSPECIFIED AFFECTING RIGHT DOMINANT SIDE: ICD-10-CM

## 2023-01-09 NOTE — ED PROVIDER NOTE - CLINICAL SUMMARY MEDICAL DECISION MAKING FREE TEXT BOX
Code stroke called, patient w/ PMHx of hypothyroidism presenting with right sided weakness/right neck heaviness, last known well time of 2PM  NIH Stroke Scale documented.  Given history and exam, I have low suspicion for infectious etiology, neurological changes secondary to toxicologic ingestions, seizures, or complex migraine. Presentation concerning for possible CVA requiring workup.  - FSG: normal, CBC, BMP, Troponin, PT/INR/PTT, T&S, EKG, CXR, CT Head w/ CTA brain/neck/perfusion to r/o large vessel occlusion amenable to thrombectomy.  - Tele-stroke neurology consult. Patient is NOT a TPA candidate.  - Permissive HTN < 220/120 in non-TPA pts  - Admit to Telemetry.
yes

## 2023-09-20 NOTE — PHYSICAL THERAPY INITIAL EVALUATION ADULT - SIT-TO-STAND BALANCE
Filled out [place] paperwork with patient in office today for her recent hospitalization. She has been reaching out to her Psych team but has not heard back. States she has a lot of stressors - work, financial, lack of sleep, poor support. States she is having a hard time sleeping and feels she cannot function with the lack of sleep. States she has had passive thoughts of harm ie thinking it would be easier if not dealing with things but no active thoughts, prepared anything to hurt herself, etc. States she would not actually harm herself and wants to get better health wise. Continuously working on improving her health. She has an appt tomorrow morning with counseling and a new psychiatrist for a second opinion. States she will reach out to me tomorrow to let me know how it goes. Discussed if she has any active thoughts of self harm, etc she needs to go to ER. Dicussed to reach out if she needs anything and can page provider on call if needed. She voiced understanding. good balance

## 2023-11-17 NOTE — ED PROVIDER NOTE - PATIENT LAST KNOWN
Patient called and informed of arrival time on Tuesday 11/21/23--6:30 am. Patient voiced understanding.  
Known

## 2025-06-10 NOTE — PROGRESS NOTE ADULT - PROVIDER SPECIALTY LIST ADULT
Hospitalist impairments found/functional limitations in following categories/risk reduction/prevention